# Patient Record
Sex: MALE | Race: WHITE | NOT HISPANIC OR LATINO | Employment: FULL TIME | URBAN - METROPOLITAN AREA
[De-identification: names, ages, dates, MRNs, and addresses within clinical notes are randomized per-mention and may not be internally consistent; named-entity substitution may affect disease eponyms.]

---

## 2018-01-17 ENCOUNTER — OFFICE VISIT CONVERTED (OUTPATIENT)
Dept: CARDIOLOGY | Facility: CLINIC | Age: 63
End: 2018-01-17
Attending: INTERNAL MEDICINE

## 2018-04-23 ENCOUNTER — OFFICE VISIT CONVERTED (OUTPATIENT)
Dept: FAMILY MEDICINE CLINIC | Facility: CLINIC | Age: 63
End: 2018-04-23
Attending: NURSE PRACTITIONER

## 2018-07-23 ENCOUNTER — CONVERSION ENCOUNTER (OUTPATIENT)
Dept: CARDIOLOGY | Facility: CLINIC | Age: 63
End: 2018-07-23

## 2018-07-23 ENCOUNTER — OFFICE VISIT CONVERTED (OUTPATIENT)
Dept: CARDIOLOGY | Facility: CLINIC | Age: 63
End: 2018-07-23
Attending: INTERNAL MEDICINE

## 2019-01-28 ENCOUNTER — OFFICE VISIT CONVERTED (OUTPATIENT)
Dept: CARDIOLOGY | Facility: CLINIC | Age: 64
End: 2019-01-28
Attending: NURSE PRACTITIONER

## 2019-02-02 ENCOUNTER — HOSPITAL ENCOUNTER (OUTPATIENT)
Dept: OTHER | Facility: HOSPITAL | Age: 64
Discharge: HOME | End: 2019-02-02
Attending: INTERNAL MEDICINE

## 2019-02-02 LAB
ALBUMIN SERPL-MCNC: 4.2 G/DL (ref 3.5–5)
ALP SERPL-CCNC: 72 U/L (ref 56–155)
ALT SERPL-CCNC: 22 U/L (ref 10–40)
AST SERPL-CCNC: 18 U/L (ref 15–50)
BASOPHILS # BLD AUTO: 0.04 10*3/UL (ref 0–0.2)
BASOPHILS NFR BLD AUTO: 0.53 % (ref 0–3)
BILIRUB SERPL-MCNC: 0.73 MG/DL (ref 0.2–1.3)
CONV BILI, CONJUGATED: <0.2 MG/DL (ref 0–0.6)
CONV TOTAL PROTEIN: 7.7 G/DL (ref 6.3–8.2)
CONV UNCONJUGATED BILIRUBIN: 0.5 MG/DL (ref 0–1.1)
CREAT UR-MCNC: 0.79 MG/DL (ref 0.7–1.2)
EOSINOPHIL # BLD AUTO: 0.25 10*3/UL (ref 0–0.7)
EOSINOPHIL # BLD AUTO: 3.58 % (ref 0–7)
ERYTHROCYTE [DISTWIDTH] IN BLOOD BY AUTOMATED COUNT: 12.9 % (ref 11.5–14.5)
ERYTHROCYTE [SEDIMENTATION RATE] IN BLOOD: 13 MM/H (ref 0–20)
HBA1C MFR BLD: 14.5 G/DL (ref 14–18)
HCT VFR BLD AUTO: 42.7 % (ref 42–52)
LYMPHOCYTES # BLD AUTO: 1.55 10*3/UL (ref 1–5)
MCH RBC QN AUTO: 32.8 PG (ref 27–31)
MCHC RBC AUTO-ENTMCNC: 34 G/DL (ref 33–37)
MCV RBC AUTO: 96.4 FL (ref 80–96)
MONOCYTES # BLD AUTO: 0.5 10*3/UL (ref 0.2–1.2)
MONOCYTES NFR BLD AUTO: 7.37 % (ref 3–10)
NEUTROPHILS # BLD AUTO: 4.51 10*3/UL (ref 2–8)
NEUTROPHILS NFR BLD AUTO: 65.9 % (ref 30–85)
NRBC BLD AUTO-RTO: 0 % (ref 0–0.01)
PLATELET # BLD AUTO: 262 10*3/UL (ref 130–400)
PMV BLD AUTO: 7.5 FL (ref 7.4–10.4)
RBC # BLD AUTO: 4.42 10*6/UL (ref 4.7–6.1)
VARIANT LYMPHS NFR BLD MANUAL: 22.6 % (ref 20–45)
WBC # BLD AUTO: 6.84 10*3/UL (ref 4.8–10.8)

## 2019-03-09 ENCOUNTER — HOSPITAL ENCOUNTER (OUTPATIENT)
Dept: OTHER | Facility: HOSPITAL | Age: 64
Discharge: HOME | End: 2019-03-09
Attending: INTERNAL MEDICINE

## 2019-03-09 LAB
ALBUMIN SERPL-MCNC: 4.1 G/DL (ref 3.5–5)
ALP SERPL-CCNC: 69 U/L (ref 56–155)
ALT SERPL-CCNC: 28 U/L (ref 10–40)
AST SERPL-CCNC: 20 U/L (ref 15–50)
BASOPHILS # BLD AUTO: 0.04 10*3/UL (ref 0–0.2)
BASOPHILS NFR BLD AUTO: 0.7 % (ref 0–3)
BILIRUB SERPL-MCNC: 0.92 MG/DL (ref 0.2–1.3)
CONV ABS IMM GRAN: 0.02 10*3/UL (ref 0–0.2)
CONV BILI, CONJUGATED: <0.2 MG/DL (ref 0–0.6)
CONV IMMATURE GRAN: 0.4 % (ref 0–1.8)
CONV TOTAL PROTEIN: 7.3 G/DL (ref 6.3–8.2)
CONV UNCONJUGATED BILIRUBIN: 0.7 MG/DL (ref 0–1.1)
CREAT UR-MCNC: 0.83 MG/DL (ref 0.7–1.2)
DEPRECATED RDW RBC AUTO: 48.6 FL (ref 35.1–43.9)
EOSINOPHIL # BLD AUTO: 0.22 10*3/UL (ref 0–0.7)
EOSINOPHIL # BLD AUTO: 4 % (ref 0–7)
ERYTHROCYTE [DISTWIDTH] IN BLOOD BY AUTOMATED COUNT: 13.8 % (ref 11.6–14.4)
ERYTHROCYTE [SEDIMENTATION RATE] IN BLOOD: 9 MM/H (ref 0–20)
HBA1C MFR BLD: 14 G/DL (ref 14–18)
HCT VFR BLD AUTO: 43 % (ref 42–52)
LYMPHOCYTES # BLD AUTO: 1.42 10*3/UL (ref 1–5)
MCH RBC QN AUTO: 31.7 PG (ref 27–31)
MCHC RBC AUTO-ENTMCNC: 32.6 G/DL (ref 33–37)
MCV RBC AUTO: 97.5 FL (ref 80–96)
MONOCYTES # BLD AUTO: 0.45 10*3/UL (ref 0.2–1.2)
MONOCYTES NFR BLD AUTO: 8.2 % (ref 3–10)
NEUTROPHILS # BLD AUTO: 3.32 10*3/UL (ref 2–8)
NEUTROPHILS NFR BLD AUTO: 60.7 % (ref 30–85)
NRBC CBCN: 0 % (ref 0–0.7)
PLATELET # BLD AUTO: 239 10*3/UL (ref 130–400)
PMV BLD AUTO: 10.7 FL (ref 9.4–12.4)
RBC # BLD AUTO: 4.41 10*6/UL (ref 4.7–6.1)
VARIANT LYMPHS NFR BLD MANUAL: 26 % (ref 20–45)
WBC # BLD AUTO: 5.47 10*3/UL (ref 4.8–10.8)

## 2019-03-31 ENCOUNTER — HOSPITAL ENCOUNTER (OUTPATIENT)
Dept: URGENT CARE | Facility: CLINIC | Age: 64
Discharge: HOME | End: 2019-03-31
Attending: NURSE PRACTITIONER

## 2019-07-08 ENCOUNTER — OFFICE VISIT CONVERTED (OUTPATIENT)
Dept: CARDIOLOGY | Facility: CLINIC | Age: 64
End: 2019-07-08
Attending: INTERNAL MEDICINE

## 2019-12-23 ENCOUNTER — CONVERSION ENCOUNTER (OUTPATIENT)
Dept: FAMILY MEDICINE CLINIC | Facility: CLINIC | Age: 64
End: 2019-12-23

## 2019-12-23 ENCOUNTER — OFFICE VISIT CONVERTED (OUTPATIENT)
Dept: FAMILY MEDICINE CLINIC | Facility: CLINIC | Age: 64
End: 2019-12-23
Attending: NURSE PRACTITIONER

## 2020-01-13 ENCOUNTER — OFFICE VISIT CONVERTED (OUTPATIENT)
Dept: CARDIOLOGY | Facility: CLINIC | Age: 65
End: 2020-01-13
Attending: INTERNAL MEDICINE

## 2020-03-07 ENCOUNTER — HOSPITAL ENCOUNTER (OUTPATIENT)
Dept: OTHER | Facility: HOSPITAL | Age: 65
Discharge: HOME | End: 2020-03-07
Attending: INTERNAL MEDICINE

## 2020-03-07 LAB
ALBUMIN SERPL-MCNC: 4.4 G/DL (ref 3.5–5)
ALP SERPL-CCNC: 77 U/L (ref 56–155)
ALT SERPL-CCNC: 23 U/L (ref 10–40)
AST SERPL-CCNC: 26 U/L (ref 15–50)
BASOPHILS # BLD AUTO: 0.05 10*3/UL (ref 0–0.2)
BASOPHILS NFR BLD AUTO: 0.8 % (ref 0–3)
BILIRUB SERPL-MCNC: 0.55 MG/DL (ref 0.2–1.3)
CONV ABS IMM GRAN: 0.02 10*3/UL (ref 0–0.2)
CONV BILI, CONJUGATED: 0.2 MG/DL (ref 0–0.6)
CONV IMMATURE GRAN: 0.3 % (ref 0–1.8)
CONV TOTAL PROTEIN: 7.3 G/DL (ref 6.3–8.2)
CONV UNCONJUGATED BILIRUBIN: 0.4 MG/DL (ref 0–1.1)
CREAT UR-MCNC: 0.86 MG/DL (ref 0.7–1.2)
DEPRECATED RDW RBC AUTO: 48.8 FL (ref 35.1–43.9)
EOSINOPHIL # BLD AUTO: 0.3 10*3/UL (ref 0–0.7)
EOSINOPHIL # BLD AUTO: 4.7 % (ref 0–7)
ERYTHROCYTE [DISTWIDTH] IN BLOOD BY AUTOMATED COUNT: 13.5 % (ref 11.6–14.4)
ERYTHROCYTE [SEDIMENTATION RATE] IN BLOOD: 4 MM/H (ref 0–20)
HCT VFR BLD AUTO: 44.4 % (ref 42–52)
HGB BLD-MCNC: 14.3 G/DL (ref 14–18)
LYMPHOCYTES # BLD AUTO: 1.41 10*3/UL (ref 1–5)
LYMPHOCYTES NFR BLD AUTO: 21.9 % (ref 20–45)
MCH RBC QN AUTO: 31.7 PG (ref 27–31)
MCHC RBC AUTO-ENTMCNC: 32.2 G/DL (ref 33–37)
MCV RBC AUTO: 98.4 FL (ref 80–96)
MONOCYTES # BLD AUTO: 0.63 10*3/UL (ref 0.2–1.2)
MONOCYTES NFR BLD AUTO: 9.8 % (ref 3–10)
NEUTROPHILS # BLD AUTO: 4.04 10*3/UL (ref 2–8)
NEUTROPHILS NFR BLD AUTO: 62.5 % (ref 30–85)
NRBC CBCN: 0 % (ref 0–0.7)
PLATELET # BLD AUTO: 241 10*3/UL (ref 130–400)
PMV BLD AUTO: 10.1 FL (ref 9.4–12.4)
RBC # BLD AUTO: 4.51 10*6/UL (ref 4.7–6.1)
WBC # BLD AUTO: 6.45 10*3/UL (ref 4.8–10.8)

## 2020-06-30 ENCOUNTER — HOSPITAL ENCOUNTER (OUTPATIENT)
Dept: LAB | Facility: HOSPITAL | Age: 65
Discharge: HOME | End: 2020-06-30
Attending: INTERNAL MEDICINE

## 2020-06-30 LAB
BASOPHILS # BLD AUTO: 0.06 10*3/UL (ref 0–0.2)
BASOPHILS NFR BLD AUTO: 0.8 % (ref 0–3)
CONV ABS IMM GRAN: 0.02 10*3/UL (ref 0–0.2)
CONV IMMATURE GRAN: 0.3 % (ref 0–1.8)
CREAT UR-MCNC: 0.99 MG/DL (ref 0.7–1.2)
DEPRECATED RDW RBC AUTO: 49.6 FL (ref 35.1–43.9)
EOSINOPHIL # BLD AUTO: 0.25 10*3/UL (ref 0–0.7)
EOSINOPHIL # BLD AUTO: 3.5 % (ref 0–7)
ERYTHROCYTE [DISTWIDTH] IN BLOOD BY AUTOMATED COUNT: 13.6 % (ref 11.6–14.4)
HCT VFR BLD AUTO: 45.6 % (ref 42–52)
HGB BLD-MCNC: 14.5 G/DL (ref 14–18)
LYMPHOCYTES # BLD AUTO: 1.55 10*3/UL (ref 1–5)
LYMPHOCYTES NFR BLD AUTO: 21.6 % (ref 20–45)
MCH RBC QN AUTO: 31.7 PG (ref 27–31)
MCHC RBC AUTO-ENTMCNC: 31.8 G/DL (ref 33–37)
MCV RBC AUTO: 99.6 FL (ref 80–96)
MONOCYTES # BLD AUTO: 0.62 10*3/UL (ref 0.2–1.2)
MONOCYTES NFR BLD AUTO: 8.6 % (ref 3–10)
NEUTROPHILS # BLD AUTO: 4.69 10*3/UL (ref 2–8)
NEUTROPHILS NFR BLD AUTO: 65.2 % (ref 30–85)
NRBC CBCN: 0 % (ref 0–0.7)
PLATELET # BLD AUTO: 239 10*3/UL (ref 130–400)
PMV BLD AUTO: 11 FL (ref 9.4–12.4)
RBC # BLD AUTO: 4.58 10*6/UL (ref 4.7–6.1)
WBC # BLD AUTO: 7.19 10*3/UL (ref 4.8–10.8)

## 2020-07-02 LAB
ALBUMIN SERPL-MCNC: 4.4 G/DL (ref 3.5–5)
ALP SERPL-CCNC: 73 U/L (ref 56–155)
ALT SERPL-CCNC: 24 U/L (ref 10–40)
AST SERPL-CCNC: 22 U/L (ref 15–50)
BILIRUB SERPL-MCNC: 0.91 MG/DL (ref 0.2–1.3)
CONV BILI, CONJUGATED: <0.2 MG/DL (ref 0–0.6)
CONV TOTAL PROTEIN: 7.5 G/DL (ref 6.3–8.2)
CONV UNCONJUGATED BILIRUBIN: 0.7 MG/DL (ref 0–1.1)

## 2020-07-27 ENCOUNTER — OFFICE VISIT CONVERTED (OUTPATIENT)
Dept: PODIATRY | Facility: CLINIC | Age: 65
End: 2020-07-27
Attending: PODIATRIST

## 2020-08-01 ENCOUNTER — HOSPITAL ENCOUNTER (OUTPATIENT)
Dept: OTHER | Facility: HOSPITAL | Age: 65
Discharge: HOME | End: 2020-08-01
Attending: INTERNAL MEDICINE

## 2020-08-01 LAB
ALBUMIN SERPL-MCNC: 4.4 G/DL (ref 3.5–5)
ALBUMIN/GLOB SERPL: 1.3 {RATIO} (ref 1.4–2.6)
ALP SERPL-CCNC: 73 U/L (ref 56–155)
ALT SERPL-CCNC: 21 U/L (ref 10–40)
ANION GAP SERPL CALC-SCNC: 14 MMOL/L (ref 8–19)
AST SERPL-CCNC: 19 U/L (ref 15–50)
BASOPHILS # BLD AUTO: 0.05 10*3/UL (ref 0–0.2)
BASOPHILS NFR BLD AUTO: 0.8 % (ref 0–3)
BILIRUB SERPL-MCNC: 1.04 MG/DL (ref 0.2–1.3)
BILIRUB SERPL-MCNC: 1.07 MG/DL (ref 0.2–1.3)
BUN SERPL-MCNC: 11 MG/DL (ref 5–25)
BUN/CREAT SERPL: 12 {RATIO} (ref 6–20)
CALCIUM SERPL-MCNC: 10 MG/DL (ref 8.7–10.4)
CHLORIDE SERPL-SCNC: 101 MMOL/L (ref 99–111)
CHOLEST SERPL-MCNC: 130 MG/DL (ref 107–200)
CHOLEST/HDLC SERPL: 2.5 {RATIO} (ref 3–6)
CONV ABS IMM GRAN: 0.01 10*3/UL (ref 0–0.2)
CONV BILI, CONJUGATED: <0.2 MG/DL (ref 0–0.6)
CONV CO2: 27 MMOL/L (ref 22–32)
CONV IMMATURE GRAN: 0.2 % (ref 0–1.8)
CONV TOTAL PROTEIN: 7.7 G/DL (ref 6.3–8.2)
CONV UNCONJUGATED BILIRUBIN: 0.9 MG/DL (ref 0–1.1)
CREAT UR-MCNC: 0.9 MG/DL (ref 0.7–1.2)
DEPRECATED RDW RBC AUTO: 47.6 FL (ref 35.1–43.9)
EOSINOPHIL # BLD AUTO: 0.21 10*3/UL (ref 0–0.7)
EOSINOPHIL # BLD AUTO: 3.3 % (ref 0–7)
ERYTHROCYTE [DISTWIDTH] IN BLOOD BY AUTOMATED COUNT: 13.4 % (ref 11.6–14.4)
ERYTHROCYTE [SEDIMENTATION RATE] IN BLOOD: 8 MM/H (ref 0–20)
GFR SERPLBLD BASED ON 1.73 SQ M-ARVRAT: >60 ML/MIN/{1.73_M2}
GLOBULIN UR ELPH-MCNC: 3.3 G/DL (ref 2–3.5)
GLUCOSE SERPL-MCNC: 100 MG/DL (ref 70–99)
HCT VFR BLD AUTO: 45.8 % (ref 42–52)
HDLC SERPL-MCNC: 53 MG/DL (ref 40–60)
HGB BLD-MCNC: 14.9 G/DL (ref 14–18)
LDLC SERPL CALC-MCNC: 59 MG/DL (ref 70–100)
LYMPHOCYTES # BLD AUTO: 1.62 10*3/UL (ref 1–5)
LYMPHOCYTES NFR BLD AUTO: 25.8 % (ref 20–45)
MCH RBC QN AUTO: 31.8 PG (ref 27–31)
MCHC RBC AUTO-ENTMCNC: 32.5 G/DL (ref 33–37)
MCV RBC AUTO: 97.9 FL (ref 80–96)
MONOCYTES # BLD AUTO: 0.51 10*3/UL (ref 0.2–1.2)
MONOCYTES NFR BLD AUTO: 8.1 % (ref 3–10)
NEUTROPHILS # BLD AUTO: 3.88 10*3/UL (ref 2–8)
NEUTROPHILS NFR BLD AUTO: 61.8 % (ref 30–85)
NRBC CBCN: 0 % (ref 0–0.7)
OSMOLALITY SERPL CALC.SUM OF ELEC: 285 MOSM/KG (ref 273–304)
PLATELET # BLD AUTO: 227 10*3/UL (ref 130–400)
PMV BLD AUTO: 9.9 FL (ref 9.4–12.4)
POTASSIUM SERPL-SCNC: 4.4 MMOL/L (ref 3.5–5.3)
RBC # BLD AUTO: 4.68 10*6/UL (ref 4.7–6.1)
SODIUM SERPL-SCNC: 138 MMOL/L (ref 135–147)
TRIGL SERPL-MCNC: 88 MG/DL (ref 40–150)
VLDLC SERPL-MCNC: 18 MG/DL (ref 5–37)
WBC # BLD AUTO: 6.28 10*3/UL (ref 4.8–10.8)

## 2020-08-10 ENCOUNTER — OFFICE VISIT CONVERTED (OUTPATIENT)
Dept: CARDIOLOGY | Facility: CLINIC | Age: 65
End: 2020-08-10
Attending: INTERNAL MEDICINE

## 2020-10-30 ENCOUNTER — HOSPITAL ENCOUNTER (OUTPATIENT)
Dept: LAB | Facility: HOSPITAL | Age: 65
Discharge: HOME | End: 2020-10-30
Attending: INTERNAL MEDICINE

## 2020-10-30 LAB
BASOPHILS # BLD AUTO: 0.05 10*3/UL (ref 0–0.2)
BASOPHILS NFR BLD AUTO: 0.7 % (ref 0–3)
CONV ABS IMM GRAN: 0.02 10*3/UL (ref 0–0.2)
CONV IMMATURE GRAN: 0.3 % (ref 0–1.8)
DEPRECATED RDW RBC AUTO: 49.5 FL (ref 35.1–43.9)
EOSINOPHIL # BLD AUTO: 0.19 10*3/UL (ref 0–0.7)
EOSINOPHIL # BLD AUTO: 2.8 % (ref 0–7)
ERYTHROCYTE [DISTWIDTH] IN BLOOD BY AUTOMATED COUNT: 13.5 % (ref 11.6–14.4)
ERYTHROCYTE [SEDIMENTATION RATE] IN BLOOD: 7 MM/H (ref 0–20)
HCT VFR BLD AUTO: 46.9 % (ref 42–52)
HGB BLD-MCNC: 14.9 G/DL (ref 14–18)
LYMPHOCYTES # BLD AUTO: 1.84 10*3/UL (ref 1–5)
LYMPHOCYTES NFR BLD AUTO: 27.3 % (ref 20–45)
MCH RBC QN AUTO: 31.7 PG (ref 27–31)
MCHC RBC AUTO-ENTMCNC: 31.8 G/DL (ref 33–37)
MCV RBC AUTO: 99.8 FL (ref 80–96)
MONOCYTES # BLD AUTO: 0.59 10*3/UL (ref 0.2–1.2)
MONOCYTES NFR BLD AUTO: 8.7 % (ref 3–10)
NEUTROPHILS # BLD AUTO: 4.06 10*3/UL (ref 2–8)
NEUTROPHILS NFR BLD AUTO: 60.2 % (ref 30–85)
NRBC CBCN: 0 % (ref 0–0.7)
PLATELET # BLD AUTO: 264 10*3/UL (ref 130–400)
PMV BLD AUTO: 10.4 FL (ref 9.4–12.4)
RBC # BLD AUTO: 4.7 10*6/UL (ref 4.7–6.1)
WBC # BLD AUTO: 6.75 10*3/UL (ref 4.8–10.8)

## 2020-10-31 LAB
ALBUMIN SERPL-MCNC: 4.7 G/DL (ref 3.5–5)
ALP SERPL-CCNC: 81 U/L (ref 56–155)
ALT SERPL-CCNC: 25 U/L (ref 10–40)
AST SERPL-CCNC: 27 U/L (ref 15–50)
BILIRUB SERPL-MCNC: 1.17 MG/DL (ref 0.2–1.3)
CONV BILI, CONJUGATED: <0.2 MG/DL (ref 0–0.6)
CONV TOTAL PROTEIN: 7.7 G/DL (ref 6.3–8.2)
CONV UNCONJUGATED BILIRUBIN: 1 MG/DL (ref 0–1.1)
CREAT UR-MCNC: 0.85 MG/DL (ref 0.7–1.2)

## 2020-11-13 ENCOUNTER — CONVERSION ENCOUNTER (OUTPATIENT)
Dept: FAMILY MEDICINE CLINIC | Facility: CLINIC | Age: 65
End: 2020-11-13

## 2020-11-13 ENCOUNTER — OFFICE VISIT CONVERTED (OUTPATIENT)
Dept: FAMILY MEDICINE CLINIC | Facility: CLINIC | Age: 65
End: 2020-11-13
Attending: NURSE PRACTITIONER

## 2020-12-28 ENCOUNTER — HOSPITAL ENCOUNTER (OUTPATIENT)
Dept: LAB | Facility: HOSPITAL | Age: 65
Discharge: HOME | End: 2020-12-28
Attending: INTERNAL MEDICINE

## 2020-12-28 LAB
ALBUMIN SERPL-MCNC: 4.6 G/DL (ref 3.5–5)
ALP SERPL-CCNC: 79 U/L (ref 56–155)
ALT SERPL-CCNC: 24 U/L (ref 10–40)
AST SERPL-CCNC: 26 U/L (ref 15–50)
BASOPHILS # BLD AUTO: 0.04 10*3/UL (ref 0–0.2)
BASOPHILS NFR BLD AUTO: 0.6 % (ref 0–3)
BILIRUB SERPL-MCNC: 1.32 MG/DL (ref 0.2–1.3)
CONV ABS IMM GRAN: 0.01 10*3/UL (ref 0–0.2)
CONV BILI, CONJUGATED: 0.2 MG/DL (ref 0–0.6)
CONV IMMATURE GRAN: 0.1 % (ref 0–1.8)
CONV TOTAL PROTEIN: 8 G/DL (ref 6.3–8.2)
CONV UNCONJUGATED BILIRUBIN: 1.1 MG/DL (ref 0–1.1)
CREAT UR-MCNC: 0.72 MG/DL (ref 0.7–1.2)
DEPRECATED RDW RBC AUTO: 47.8 FL (ref 35.1–43.9)
EOSINOPHIL # BLD AUTO: 0.16 10*3/UL (ref 0–0.7)
EOSINOPHIL # BLD AUTO: 2.2 % (ref 0–7)
ERYTHROCYTE [DISTWIDTH] IN BLOOD BY AUTOMATED COUNT: 13.3 % (ref 11.6–14.4)
ERYTHROCYTE [SEDIMENTATION RATE] IN BLOOD: 10 MM/H (ref 0–20)
HCT VFR BLD AUTO: 45.9 % (ref 42–52)
HGB BLD-MCNC: 15 G/DL (ref 14–18)
LYMPHOCYTES # BLD AUTO: 1.76 10*3/UL (ref 1–5)
LYMPHOCYTES NFR BLD AUTO: 24.5 % (ref 20–45)
MCH RBC QN AUTO: 31.8 PG (ref 27–31)
MCHC RBC AUTO-ENTMCNC: 32.7 G/DL (ref 33–37)
MCV RBC AUTO: 97.2 FL (ref 80–96)
MONOCYTES # BLD AUTO: 0.59 10*3/UL (ref 0.2–1.2)
MONOCYTES NFR BLD AUTO: 8.2 % (ref 3–10)
NEUTROPHILS # BLD AUTO: 4.61 10*3/UL (ref 2–8)
NEUTROPHILS NFR BLD AUTO: 64.4 % (ref 30–85)
NRBC CBCN: 0 % (ref 0–0.7)
PLATELET # BLD AUTO: 266 10*3/UL (ref 130–400)
PMV BLD AUTO: 10.2 FL (ref 9.4–12.4)
RBC # BLD AUTO: 4.72 10*6/UL (ref 4.7–6.1)
WBC # BLD AUTO: 7.17 10*3/UL (ref 4.8–10.8)

## 2021-03-31 ENCOUNTER — CONVERSION ENCOUNTER (OUTPATIENT)
Dept: OTHER | Facility: HOSPITAL | Age: 66
End: 2021-03-31

## 2021-03-31 ENCOUNTER — OFFICE VISIT CONVERTED (OUTPATIENT)
Dept: CARDIOLOGY | Facility: CLINIC | Age: 66
End: 2021-03-31
Attending: NURSE PRACTITIONER

## 2021-05-10 NOTE — H&P
History and Physical      Patient Name: Yoan Franco Jr.   Patient ID: [id number]   Sex: Male   YOB: 1955    Primary Care Provider: Ian SIMMONS   Referring Provider: Ian SIMMONS    Visit Date: July 27, 2020    Provider: Poncho Shaffer DPM   Location: [facility]   Location Address: [address] [place] [address]  [id number]   Location Phone: [phone]          Chief Complaint  · Left Foot Pain  · Fungal Toenails      History Of Present Illness  Yoan Franco Jr. is a 64 year old /White male who presents to the [facility] today new patient referred from Ian SIMMONS.   Yoan Fracno Jr. presents to the office today for evaluation and treatment of      New, Established, New Problem: New  Location: Left ankle  Duration: Early 2019  Onset: Insidious  Nature: Sore, intermittent  Stable, worsening, improving: Stable  Aggravating factors:  Patient relates pain is aggravated by shoe gear and ambulation.    Previous Treatment: None    Patient denies any fevers, chills, nausea, vomiting, shortness of breathe, nor any other constitutional signs nor symptoms.    Patient states he is treated for psoriatic and rheumatoid arthritis.    Patient works as a manager at [email or web address].      New, Established, New Problem: Second problem  Location: Toenails  Duration:  Greater than five years  Onset: Gradual  Nature: sore with palpation.  Stable, worsening, improving: Worsening  Aggravating factors: Pain with shoe gear and ambulation.  Previous Treatment:       Past Medical History  Allergies; Ankle pain; Ankle pain, left; Arthritis; Deafness; Essential hypertension; Hammertoe; Heart Attack; Heel pain; Hemorrhoids; Hyperlipidemia; Hyperlipidemia; Hypertension; Ingrown toenail; Kidney Disease; Skin Disease         Past Surgical History  cardiac stents; Joint Surgery; Knee surgery         Medication List  aspirin 81  mg oral tablet,delayed release (DR/EC); atorvastatin 20 mg oral tablet; bilberry 100 mg oral capsule; carvedilol 3.125 mg oral tablet; Cinnamon 500 mg oral capsule; Co Q-10 200 mg oral capsule; flaxseed oil-omega 3,6,9 1,300 mg-845 mg -117 mg-117 mg oral capsule; folic acid 1 mg oral tablet; L-Carnitine 500 mg oral tablet; losartan 25 mg oral tablet; lutein-zeaxanthin 25-5 mg oral capsule; magnesium oral; magnesium oxide 400 mg magnesium oral tablet; Men's Multivitamin 400- mcg oral tablet; methotrexate sodium 2.5 mg oral tablet; milk thistle seed extract 200 mg oral capsule; nitroglycerin 0.4 mg sublingual tablet, sublingual; pyridoxine (vitamin B6) 200 mg oral tablet extended release; spironolactone 25 mg oral tablet; turmeric root extract 500 mg oral capsule; vitamin B complex oral tablet; Vitamin B-12 2,500 mcg sublingual tablet, sublingual; Vitamin C With Ericka Hips 1,000 mg oral tablet; zinc gluconate 50 mg oral tablet         Allergy List  CORTICOSTEROIDS (GLUCOCORTICOIDS); Latex       Allergies Reconciled  Family Medical History  Breast Neoplasm, Malignant; Pancreatic Neoplasm, Malignant; Stroke; Heart Disease; Diabetes Mellitus, Type II         Social History  Alcohol (Current some day); Tobacco (Former)         Immunizations  Name Date Admin   Hepatitis A    Hepatitis A    Influenza    Influenza    [id number]    Prevnar 13          Review of Systems  · Constitutional  o Denies  o : fever, chills, additional constitutional symptoms except as noted in the HPI  · Eyes  o Denies  o : double vision  · HENT  o Denies  o : vertigo, recent head injury  · Cardiovascular  o Denies  o : chest pain, irregular heart beats  · Respiratory  o Denies  o : shortness of breath  · Gastrointestinal  o Denies  o : nausea, vomiting  · Integument  o Denies  o : new skin lesions  · Neurologic  o Denies  o : altered mental status, tingling or numbness  · Musculoskeletal  o * See HPI      Vitals  Date Time BP Position Site L\R  "Cuff Size HR RR TEMP (F) WT  HT  BMI kg/m2 BSA m2 O2 Sat HC       07/27/2020 02:54 /67 Sitting    48 - R  98 251lbs 16oz 5'  10\" 36.16 2.38 98 %          Physical Examination  · Constitutional  o Appearance  o : Awake, alert, well developed, well nourished and well groomed  · Cardiovascular  o Peripheral Vascular System  o :   § Pedal Pulses  § : Pedal Pulses are +2 and symmetrical   § Extremities  § : No edema of the lower extremities  · Musculoskeletal  o General  o :   § General Musculoskeletal  § : Lower extremity muscle strength and range of motion is equal and symmetrical bilaterally. Nonreducible contracted lesser digits seen bilaterally.  · Skin and Subcutaneous Tissue  o General Inspection  o : Skin is noted to have normal texture and turgor, with no excresences noted.  o Digits and Nails  o : The tonails are noted to be without disease.  · Neurologic  o Sensation  o : Epicritic sensations intact bilaterally.  · Left Ankle/Foot  o Inspection  o : Left ankle dorsiflexion is painful. Left ankle plantarflexion is painful. Improved compared to previous visit. No signs of edema, erythema, lymphangitis, nor signs of infection.   · In Office Procedures  o View  o : AP/LATERAL/OBLIQUE   o Site  o : left, ankle   o Indication  o : Left ankle pain   o Study  o : X-rays ordered, taken in the office, and reviewed today.  o Xray  o : Early degenerative changes were seen throughout the right ankle with slight narrowing of the ankle joint mortise.  o Comparative Data  o : No comparative data found     Toenails 1, 2, 3, 4, and 5 bilaterally are incurvated, elongated, thickened, yellow, chalky, and painful to palpation.  No signs of edema, erythema, lymphangitis, or signs of infection.      Toenails 1 through 5 bilaterally were debrided in thickness and length.               Assessment  · Foot pain, bilateral       Pain in right foot     729.5/M79.671  Pain in left foot     729.5/M79.672  · Primary osteoarthritis, " left ankle and foot     715.17/M19.072  · Ingrowing nail     703.0/L60.0  · Onychomycosis     110.1/B35.1  · Tinea unguium     110.1/B35.1      Plan  · Orders  o Ankle (Left) 3 views X-Ray [facility] Preferred View (95584-[id number]) - - 07/27/2020  o Debridement of six or more nails (18692) - - 07/27/2020  · Medications  o Medications have been Reconciled  o Transition of Care or Provider Policy  · Instructions  o I have discussed the findings of this evaluation with the patient. The discussion included a complete verbal explanation of any changes in the examination results, diagnosis, and the current treatment plan. A schedule for future care needs was explained. If any questions should arise after returning home, I have encouraged the patient to feel free to contact Dr. Shaffer. The patient states understanding and agreement with this plan.  o Pt to monitor for problems and to contact Dr. Shaffer for follow-up should such signs occur. Patient states understanding and agreement with this plan.   o Overview: Osteoarthritis occurs when progressive damage to articular cartilage is caused by multiple factors that are comprised of joint integrity, genetics, local inflammation, mechanical forces, and cellular and biochemical processes. It is the most common joint disorder and leading cause of disability for patients over the age of 65. Males and females are equally affected and genetics play a role in up to 65% of cases. Osteoarthritis can be categorized as either inflammatory or noninflammatory based on presentation. Patients typically complain of joint pain and stiffness. Joint tenderness, crepitus or joint effusion may be present. The objective of therapy is to control pain, decrease swelling, reduce disability and enhance quality of life.  o Pharmacological Treatment: This patient is having an acute flare of osteoarthritis and intra-articular injection of a corticosteroid has been administered. Patient is advised that  intra-articular corticosteroid injections are not to exceed more than 3 per year.   o Discuss Findings: I have discussed the findings of this evaluation with the patient. The discussion included a complete verbal explanation of any changes in the examination results, diagnosis, and the current treatment plan. A schedule for future care needs was explained. If any questions should arise after returning home, I have encouraged the patient to feel free to contact Dr. Shaffer. The patient states understanding and agreement with this plan.  o Patient states he would like to continue to trim his own toenails at home and will contact Dr. Shaffer if these get too difficult to take care of by himself at home.  o Upon giving treatment options of a cortisone injection in the left ankle, the patient state his left ankle does not hurt that bad today but will consider it will contact Dr. Shaffer's office for an appointment should symptoms persist or worsen.  o Electronically Identified Patient Education Materials Provided Electronically  · Disposition  o Call or Return if symptoms worsen or persist.            Electronically Signed by: Poncho Shaffer DPM -Author on July 27, 2020 03:30:03 PM

## 2021-05-13 NOTE — PROGRESS NOTES
Progress Note      Patient Name: Yoan Franco Jr.   Patient ID: [id number]   Sex: Male   YOB: 1955    Primary Care Provider: Ian SIMMONS   Referring Provider: Ian SIMMONS    Visit Date: August 10, 2020    Provider: Nevaeh Lovelace MD   Location: [facility]   Location Address: [address] [place] [address], [address]   [place]  [id number]   Location Phone: [phone]          Chief Complaint     Follow up on coronary artery disease.       History Of Present Illness  REFERRING PROVIDER: Ian SIMMONS   Yoan Franco Jr. is a 64 year old /White male who denies any chest pain or pressure. No palpitations, shortness of breath, swelling, dizziness, syncope, PND, or orthopnea. Cardiac-wise, he is feeling very well. He exercises on a stationary bike. He had gained a lot of weight, he said, due to COVID, but he has lost it back down, so his weight is the same as it was 6 months ago, and overall, he is feeling good. Blood pressures at home are running between 109/67 to 126/69 and all in good range.   PAST MEDICAL HISTORY: Coronary artery disease with previous MI and stent/PCI to the LAD (July 2016); Hyperlipidemia; Hypertension.   FAMILY HISTORY: Positive for hypertension. Negative for diabetes mellitus or heart disease.   PSYCHOSOCIAL HISTORY: Previously smoked, but quit. Moderate alcohol consumption. Denies mood changes or depression.   CURRENT MEDICATIONS: Aspirin 81 mg daily; atorvastatin 20 mg q. h.s.; carvedilol 3.125 mg q. h.s.; losartan 25 mg daily; methotrexate 2.5 4 a week; nitroglycerin p.r.n.; spironolactone 25 mg every 2 days; large amount of various over-the-counter supplements.       Review of Systems  · Cardiovascular  o Denies  o : palpitations (fast, fluttering, or skipping beats), swelling (feet, ankles, hands), shortness of breath while walking or lying flat, chest pain or angina pectoris   · Respiratory  o Denies  o : chronic or frequent  "cough, asthma or wheezing      Vitals  Date Time BP Position Site L\R Cuff Size HR RR TEMP (F) WT  HT  BMI kg/m2 BSA m2 O2 Sat HC       08/10/2020 11:52 /66 Sitting    80 - R   250lbs 0oz 5'  10\" 35.87 2.37           Physical Examination  · Constitutional  o Appearance  o : Awake, alert, in no acute distress, accompanied by his wife.  · Respiratory  o Respiratory  o : Increased AP diameter with diminished breath sounds. Prolonged expiration. Negative rales or rhonchi.   · Cardiovascular  o Heart  o : PMI not well felt. S1, S2 regular. No S3. No S4. Negative systolic/diastolic murmur.   o Peripheral Vascular System  o :   § Extremities  § : Good femoral and pedal pulses. No pedal edema.  · Gastrointestinal  o Abdominal Examination  o : Soft. No tenderness or masses felt. No hepatosplenomegaly. Abdominal aorta is not palpable.  · Labs  o Labs  o : Total cholesterol 130, triglycerides 88, HDL 53, LDL 59. Blood sugar 100.          Assessment     1.  Coronary artery disease with previous MI and stent without angina.  2.  Hyperlipidemia, at goal.  3.  Hypertension, controlled.       Plan     1.  Continue current medications.  2.  Follow up in 6 months with EKG and labs or earlier if needed.      IRIS Zimmerman/Nevaeh Lovelace MD, [facility]  JF:PM:vm               Electronically Signed by: Kamilla Gong-, Other -Author on August 12, 2020 11:25:08 AM  Electronically Co-signed by: Nevaeh Lovelace MD -Reviewer on August 14, 2020 04:48:12 PM  "

## 2021-05-13 NOTE — PROGRESS NOTES
Progress Note      Patient Name: Yoan Franco Jr.   Patient ID: [id number]   Sex: Male   YOB: 1955    Primary Care Provider: Ian SIMMONS   Referring Provider: Ian SIMMONS    Visit Date: November 13, 2020    Provider: IRIS Spivey   Location: [facility]   Location Address: [address] [place] [address], [address] [place] [address]  [id number]   Location Phone: [phone]          Chief Complaint  · Follow up one year      History Of Present Illness  Yoan Franco Jr. is a 65 year old /White male who presents for evaluation and treatment of:      Patient presents to the office today for an annual checkup.  Patient states that he is doing well and denies any concerns or complaints at this time.  He does state that he sees cardiology on a routine basis and has had labs completed at the hospital.  I did review his lipid panel CMP and CBC that was in [facility].  Patient is due for a PSA in December.  I explained to the patient that we will give him his order and he could do this at his convenience.  Patient states that his blood pressures been running in the 120s over 60s at home.  He denies any chest pain shortness of breath or palpitations.  Patient will be due for Cologuard next year as his last one was unremarkable.       Past Medical History  Disease Name Date Onset Notes   Allergies --  --    Ankle pain --  --    Ankle pain, left 07/27/2020 --    Arthritis --  --    Deafness --  --    Essential hypertension 04/23/2018 --    Hammertoe --  --    Heart Attack --  --    Heel pain --  --    Hemorrhoids --  --    Hyperlipidemia --  --    Hyperlipidemia 04/23/2018 --    Hypertension --  --    Ingrown toenail --  --    Kidney Disease --  --    Skin Disease --  --          Past Surgical History  Procedure Name Date Notes   cardiac stents 2016 2016   Joint Surgery --  --    Knee surgery --  --          Medication List  Name Date Started Instructions   apple  cider vinegar 600 mg oral capsule  take 1 capsule by oral route daily   aspirin 81 mg oral tablet,delayed release (DR/EC)  take 1 tablet (81 mg) by oral route once daily   atorvastatin 20 mg oral tablet 09/01/2020 TAKE 1 TABLET(20 MG) BY MOUTH EVERY DAY   Benefiber Clear SF (dextrin) 3 gram/3.5 gram oral powder in packet  dissolve as directed 1 packet and take by oral route As needed   bilberry 100 mg oral capsule  take 1 capsule by oral route daily   Brilinta 90 mg oral tablet  take 1 tablet (90 mg) by oral route 2 times per day   carvedilol 3.125 mg oral tablet 10/08/2020 TAKE 1 TABLET BY MOUTH EVERY DAY   Cinnamon 500 mg oral capsule  take 2 capsules by oral route daily   Co Q-10 200 mg oral capsule  take 1 capsule by oral route daily   flaxseed oil-omega 3,6,9 1,300 mg-845 mg -117 mg-117 mg oral capsule  take 2 capsules by oral route daily   folic acid 1 mg oral tablet  take 1 tablet (1 mg) by oral route once daily   L-Carnitine 500 mg oral tablet  take 1 tablet by oral route daily   losartan 25 mg oral tablet 03/17/2020 TAKE ONE TABLET BY MOUTH EVERY DAY   lutein-zeaxanthin 25-5 mg oral capsule  take 1 capsule by oral route daily   magnesium oral  --    magnesium oxide 400 mg magnesium oral tablet  take 1 tablet by oral route daily   Men's Multivitamin 400- mcg oral tablet  take 1 tablet by oral route daily   methotrexate sodium 2.5 mg oral tablet  take 1 tablet by oral route QD for 4 days   milk thistle seed extract 200 mg oral capsule  take 2 capsules by oral route daily   nitroglycerin 0.4 mg sublingual tablet, sublingual 07/02/2020 ONE TABLET UNDER TONGUE AS NEEDED FOR CHEST PAIN OR AS DIRECTED   Probiotic oral  --    pyridoxine (vitamin B6) 200 mg oral tablet extended release  take 1 tablet by oral route daily   spironolactone 25 mg oral tablet 08/03/2020 Take 1 tablet by mouth every other day   turmeric root extract 500 mg oral capsule  take 2 capsules by oral route daily   vitamin B complex oral  tablet  take 1 tablet by oral route daily   Vitamin B-12 2,500 mcg sublingual tablet, sublingual  place 1 tablet under tongue by translingual route daily   Vitamin C With Ericka Hips 1,000 mg oral tablet  take 1 tablet by oral route daily   zinc gluconate 50 mg oral tablet  take 1 tablet by oral route daily         Allergy List  Allergen Name Date Reaction Notes   CORTICOSTEROIDS (GLUCOCORTICOIDS) --  --  --    Latex --  --  --          Family Medical History  Disease Name Relative/Age Notes   Breast Neoplasm, Malignant  --    Pancreatic Neoplasm, Malignant  --    Stroke  --    Heart Disease  --    Diabetes Mellitus, Type II  --          Social History  Finding Status Start/Stop Quantity Notes   Alcohol Current some day --/-- --  --    Tobacco Former --/-- --  --          Immunizations  NameDate Admin Mfg Trade Name Lot Number Route Inj VIS Given VIS Publication   Hepatitis A02/07/2019 SKB HAVRIX-ADULT [id number]  RD 02/07/2019 07/20/2016   Comments: ptleft office in stable condition   Hepatitis A04/23/2018 SKB HAVRIX-ADULT [id number] IM  04/23/2018 10/25/2011   Comments: Pt tolerated the injection well.   Rnuweswoo61/30/2020 SKB Fluarix, quadrivalent, preservative free [id number]  LD 10/30/2020    Comments:    Khxjemksd8183/07/2019 MSD PNEUMOVAX 23 [id number] Pending sale to [facility] 02/07/2019 04/24/2015   Comments: pt left office in stable condition   Prevnar 1304/23/2018 [facility] PREVNAR 13 [id number] IM  04/23/2018 11/05/2015   Comments: Pt tolerated the injection well.         Review of Systems  · Constitutional  o Denies  o : fever, fatigue, weight loss, weight gain  · Cardiovascular  o Denies  o : lower extremity edema, claudication, chest pressure, palpitations  · Respiratory  o Denies  o : shortness of breath, wheezing, cough, hemoptysis, dyspnea on exertion  · Gastrointestinal  o Denies  o : nausea, vomiting, diarrhea, constipation, abdominal pain      Vitals  Date Time BP Position Site L\R Cuff Size HR RR TEMP (F) WT  HT  BMI kg/m2 BSA m2 O2  "Sat FR L/min FiO2        11/13/2020 03:29 /70 Sitting    64 - R  97.8 256lbs 0oz 5'  10\" 36.73 2.39 98 %            Physical Examination  · Constitutional  o Appearance  o : well-nourished, in no acute distress  · Neck  o Inspection/Palpation  o : normal appearance, no masses or tenderness, trachea midline  o Range of Motion  o : cervical range of motion within normal limits  o Thyroid  o : gland size normal, nontender, no nodules or masses present on palpation  · Respiratory  o Respiratory Effort  o : breathing unlabored  o Inspection of Chest  o : normal appearance  o Auscultation of Lungs  o : normal breath sounds throughout inspiration and expiration  · Cardiovascular  o Heart  o :   § Auscultation of Heart  § : regular rate and rhythm, no murmurs, gallops or rubs  o Peripheral Vascular System  o :   § Pedal Pulses  § : pulses 2 bilaterally  § Extremities  § : no clubbing or edema  · Gastrointestinal  o Abdominal Examination  o : abdomen nontender to palpation, tone normal without rigidity or guarding, no masses present, bowel sounds present in all four quadrants  · Skin and Subcutaneous Tissue  o General Inspection  o : no rashes or lesions present, no areas of discoloration  o Body Hair  o : hair normal for age, general body hair distribution normal for age  o Digits and Nails  o : no clubbing, cyanosis, deformities or edema present, normal appearing nails  · Neurologic  o Mental Status Examination  o :   § Orientation  § : grossly oriented to person, place and time  o Gait and Station  o : normal gait, able to stand without difficulty  · Psychiatric  o Judgement and Insight  o : judgment and insight intact  o Mood and Affect  o : mood normal, affect appropriate  o Presence of Abnormal Thoughts  o : no hallucinations, no delusions present, no psychotic thoughts          Assessment  · Screening for prostate cancer     V76.44/Z12.5  · Well adult exam     V70.0/Z00.00      Plan  · Orders  o PSA " Ultrasensitive, ANNUAL SCREENING [facility] (, [address]) - V76.44/Z12.5 - 11/13/2020  o ACO-39: Current medications updated and reviewed (1159F, ) - - 11/13/2020  · Medications  o Medications have been Reconciled  o Transition of Care or Provider Policy  · Instructions  o Patient was educated/instructed on their diagnosis, treatment and medications prior to discharge from the clinic today.  o Time spent with the patient was minutes, more than 50% face to face.  o Electronically Identified Patient Education Materials Provided Electronically  · Disposition  o Call or Return if symptoms worsen or persist.  o Follow up as scheduled            Electronically Signed by: IRIS Spivey -Author on November 13, 2020 04:30:23 PM

## 2021-05-14 VITALS
DIASTOLIC BLOOD PRESSURE: 66 MMHG | HEIGHT: 70 IN | SYSTOLIC BLOOD PRESSURE: 136 MMHG | WEIGHT: 254 LBS | HEART RATE: 52 BPM | BODY MASS INDEX: 36.36 KG/M2

## 2021-05-14 VITALS
OXYGEN SATURATION: 98 % | HEART RATE: 64 BPM | HEIGHT: 70 IN | BODY MASS INDEX: 36.65 KG/M2 | WEIGHT: 256 LBS | SYSTOLIC BLOOD PRESSURE: 142 MMHG | DIASTOLIC BLOOD PRESSURE: 70 MMHG | TEMPERATURE: 97.8 F

## 2021-05-14 NOTE — PROGRESS NOTES
"   Progress Note      Patient Name: Yoan Franco Jr.   Patient ID: [id number]   Sex: Male   YOB: 1955    Primary Care Provider: Ian SIMMONS   Referring Provider: Ian SIMMONS    Visit Date: March 31, 2021    Provider: IRIS Espinoza   Location: [facility]   Location Address: [address] [place] [address], [address]   [place], [place]  [id number]   Location Phone: [phone]          History Of Present Illness  REFERRING PROVIDER: Ian SIMMONS   Yoan Franco Jr. is a 65 year old /White male who denies any chest pain or pressure. No palpitations, shortness of breath, swelling, dizziness, syncope, PND, or orthopnea. Cardiac-wise he is feeling very well. His weight is up 4 pounds since his last visit. He has not had the COVID vaccine and is not inclined to get it at this time.   PAST MEDICAL HISTORY: Coronary artery disease with previous MI and stent/PCI to the LAD (July 2016); Hyperlipidemia; Hypertension.   PSYCHOSOCIAL HISTORY: Moderate use of alcohol. Previous use of tobacco, but quit.   CURRENT MEDICATIONS: Aspirin 81 mg daily; atorvastatin 20 mg q. h.s.; carvedilol 3.125 mg q. h.s.; losartan 25 mg daily; methotrexate 2.5 mg 4 tabs a week; nitroglycerin p.r.n.; spironolactone 25 mg every other day; large amount of various over-the-counter supplements.      ALLERGIES: No known drug allergies.       Review of Systems  · Cardiovascular  o Denies  o : palpitations (fast, fluttering, or skipping beats), swelling (feet, ankles, hands), shortness of breath while walking or lying flat, chest pain or angina pectoris   · Respiratory  o Denies  o : chronic or frequent cough      Vitals  Date Time BP Position Site L\R Cuff Size HR RR TEMP (F) WT  HT  BMI kg/m2 BSA m2 O2 Sat FR L/min FiO2 HC       03/31/2021 03:15 /66 Sitting    52 - R   253lbs 16oz 5'  10\" 36.44 2.39             Physical Examination  · Constitutional  o Appearance  o : Awake, alert, in no acute distress, " accompanied by his wife.  · Eyes  o Conjunctivae  o : Conjunctivae normal.  · Ears, Nose, Mouth and Throat  o Oral Cavity  o :   § Oral Mucosa  § : Normal.  · Neck  o Jugular Veins  o : No JVD. Good carotid upstroke. No bruits noted.  · Respiratory  o Respiratory  o : Increased AP diameter with diminished breath sounds. Prolonged expiration. Negative rales or rhonchi.   · Cardiovascular  o Heart  o : PMI is not well felt. S1, S2 normal. No S3. No S4. Negative systolic/diastolic murmur.  o Peripheral Vascular System  o :   § Extremities  § : Good femoral and pedal pulses. No pedal edema.  · Gastrointestinal  o Abdominal Examination  o : Soft. No tenderness or masses felt. No hepatosplenomegaly. Abdominal aorta is not palpable.  · EKG  o EKG  o : Performed in the office today.   o Indications  o : Coronary artery disease.  o Results  o : Sinus bradycardia, rate of 49.   o Comparison  o : Rate is a little bit slower than his EKG of July 2019.   · Labs  o Labs  o : Total cholesterol 133, triglycerides 112, HDL 51, LDL 60.          Assessment     1.  Coronary artery disease with previous MI and stent/PCI, without angina.   2.  Bradycardia, stable.   3.  Hyperlipidemia, at goal.       Plan     1.  He has been bradycardic for a long time and tolerates the low dose carvedilol without any fatigue or side        effects so continue the carvedilol.  2.  Continue the losartan, spironolactone in view of his hypertension and coronary artery disease.   3.  Continue the atorvastatin in view of his hyperlipidemia.  4.  Continue the aspirin in view of his coronary artery disease.  5.  Discussed the COVID vaccine and indications.  He agrees to think about receiving the vaccine.   6.  Follow up in 9 months with labs or earlier if needed.     IRIS Zimmerman  JF/rt               Electronically Signed by: Swetha Pradhan-, Other -Author on April 12, 2021 02:58:09 PM  Electronically Co-signed by: IRIS Espinoza  -Reviewer on April 12, 2021 03:42:44 PM

## 2021-05-15 VITALS
DIASTOLIC BLOOD PRESSURE: 80 MMHG | HEIGHT: 70 IN | WEIGHT: 250 LBS | BODY MASS INDEX: 35.79 KG/M2 | SYSTOLIC BLOOD PRESSURE: 130 MMHG | HEART RATE: 54 BPM

## 2021-05-15 VITALS
HEIGHT: 70 IN | OXYGEN SATURATION: 95 % | TEMPERATURE: 98.5 F | SYSTOLIC BLOOD PRESSURE: 122 MMHG | RESPIRATION RATE: 16 BRPM | HEART RATE: 54 BPM | DIASTOLIC BLOOD PRESSURE: 74 MMHG | WEIGHT: 243 LBS | BODY MASS INDEX: 34.79 KG/M2

## 2021-05-15 VITALS
HEART RATE: 80 BPM | WEIGHT: 250 LBS | BODY MASS INDEX: 35.79 KG/M2 | HEIGHT: 70 IN | SYSTOLIC BLOOD PRESSURE: 154 MMHG | DIASTOLIC BLOOD PRESSURE: 66 MMHG

## 2021-05-15 VITALS
BODY MASS INDEX: 36.08 KG/M2 | DIASTOLIC BLOOD PRESSURE: 67 MMHG | HEART RATE: 48 BPM | OXYGEN SATURATION: 98 % | HEIGHT: 70 IN | WEIGHT: 252 LBS | SYSTOLIC BLOOD PRESSURE: 127 MMHG | TEMPERATURE: 98 F

## 2021-05-15 VITALS
WEIGHT: 241 LBS | SYSTOLIC BLOOD PRESSURE: 118 MMHG | DIASTOLIC BLOOD PRESSURE: 58 MMHG | HEART RATE: 48 BPM | BODY MASS INDEX: 34.5 KG/M2 | HEIGHT: 70 IN

## 2021-05-16 VITALS
WEIGHT: 233 LBS | SYSTOLIC BLOOD PRESSURE: 118 MMHG | HEART RATE: 50 BPM | DIASTOLIC BLOOD PRESSURE: 62 MMHG | BODY MASS INDEX: 33.36 KG/M2 | HEIGHT: 70 IN

## 2021-05-16 VITALS
WEIGHT: 238 LBS | BODY MASS INDEX: 34.07 KG/M2 | HEIGHT: 70 IN | DIASTOLIC BLOOD PRESSURE: 68 MMHG | OXYGEN SATURATION: 97 % | TEMPERATURE: 97.7 F | SYSTOLIC BLOOD PRESSURE: 125 MMHG | RESPIRATION RATE: 16 BRPM | HEART RATE: 51 BPM

## 2021-05-16 VITALS
HEART RATE: 52 BPM | SYSTOLIC BLOOD PRESSURE: 132 MMHG | DIASTOLIC BLOOD PRESSURE: 68 MMHG | HEIGHT: 70 IN | BODY MASS INDEX: 34.36 KG/M2 | WEIGHT: 240 LBS

## 2021-05-16 VITALS
HEART RATE: 56 BPM | DIASTOLIC BLOOD PRESSURE: 74 MMHG | SYSTOLIC BLOOD PRESSURE: 130 MMHG | HEIGHT: 70 IN | WEIGHT: 239 LBS | BODY MASS INDEX: 34.22 KG/M2

## 2021-08-04 RX ORDER — LOSARTAN POTASSIUM 25 MG/1
TABLET ORAL
Qty: 30 TABLET | Refills: 8 | Status: SHIPPED | OUTPATIENT
Start: 2021-08-04 | End: 2022-05-20

## 2021-09-09 RX ORDER — NITROGLYCERIN 0.4 MG/1
TABLET SUBLINGUAL
Qty: 25 TABLET | Refills: 1 | Status: SHIPPED | OUTPATIENT
Start: 2021-09-09 | End: 2021-10-12 | Stop reason: SDUPTHER

## 2021-09-10 ENCOUNTER — OFFICE VISIT (OUTPATIENT)
Dept: FAMILY MEDICINE CLINIC | Facility: CLINIC | Age: 66
End: 2021-09-10

## 2021-09-10 VITALS
OXYGEN SATURATION: 99 % | DIASTOLIC BLOOD PRESSURE: 74 MMHG | SYSTOLIC BLOOD PRESSURE: 132 MMHG | BODY MASS INDEX: 36.79 KG/M2 | HEART RATE: 63 BPM | HEIGHT: 70 IN | TEMPERATURE: 98.3 F | WEIGHT: 257 LBS

## 2021-09-10 DIAGNOSIS — Z00.00 WELL ADULT EXAM: ICD-10-CM

## 2021-09-10 DIAGNOSIS — Z12.11 SCREENING FOR COLON CANCER: ICD-10-CM

## 2021-09-10 DIAGNOSIS — E78.5 HYPERLIPIDEMIA, UNSPECIFIED HYPERLIPIDEMIA TYPE: ICD-10-CM

## 2021-09-10 DIAGNOSIS — I10 ESSENTIAL HYPERTENSION: ICD-10-CM

## 2021-09-10 DIAGNOSIS — Z12.5 SCREENING FOR PROSTATE CANCER: Primary | ICD-10-CM

## 2021-09-10 DIAGNOSIS — L40.50 PSORIATIC ARTHRITIS (HCC): ICD-10-CM

## 2021-09-10 PROCEDURE — 99397 PER PM REEVAL EST PAT 65+ YR: CPT | Performed by: NURSE PRACTITIONER

## 2021-09-10 RX ORDER — METRONIDAZOLE 7.5 MG/G
GEL TOPICAL 2 TIMES DAILY
COMMUNITY

## 2021-09-10 RX ORDER — CLOBETASOL PROPIONATE 0.5 MG/G
1 OINTMENT TOPICAL 2 TIMES DAILY
COMMUNITY

## 2021-09-10 NOTE — PROGRESS NOTES
"Chief Complaint  Annual Exam    Subjective          Yoan Franco Jr. presents to [facility]  Presents to the office today wellness physical.  Patient denies needing refills on any of his medications.  Did explain to the patient that he is due for Cologuard as well as lab work.  I explained that we would obtain a PSA for his prostate cancer screening.  He denies any concerns or complaints at this time      Objective   Vital Signs:   /74   Pulse 63   Temp 98.3 °F (36.8 °C)   Ht 177.8 cm (70\")   Wt 117 kg (257 lb)   SpO2 99%   BMI 36.88 kg/m²     Physical Exam  Vitals reviewed.   Constitutional:       Appearance: Normal appearance.   HENT:      Head: Normocephalic and atraumatic.      Right Ear: Tympanic membrane, ear canal and external ear normal.      Left Ear: Tympanic membrane, ear canal and external ear normal.   Eyes:      Extraocular Movements: Extraocular movements intact.      Conjunctiva/sclera: Conjunctivae normal.      Pupils: Pupils are equal, round, and reactive to light.   Cardiovascular:      Rate and Rhythm: Normal rate and regular rhythm.      Heart sounds: Normal heart sounds, S1 normal and S2 normal. No murmur heard.     Pulmonary:      Effort: Pulmonary effort is normal. No respiratory distress.      Breath sounds: Normal breath sounds.   Abdominal:      General: Abdomen is flat. Bowel sounds are normal.      Palpations: Abdomen is soft.   Musculoskeletal:      Cervical back: Normal range of motion and neck supple.   Skin:     General: Skin is warm and dry.      Comments: Deformity noted to his hands bilaterally secondary to psoriatic arthritis   Neurological:      Mental Status: He is alert and oriented to person, place, and time.   Psychiatric:         Attention and Perception: Attention normal.         Mood and Affect: Mood normal.         Behavior: Behavior normal.        Result Review :                Assessment and Plan    Diagnoses and all " orders for this visit:    1. Screening for prostate cancer (Primary)  -     PSA SCREENING; Future    2. Hyperlipidemia, unspecified hyperlipidemia type  -     CBC (No Diff); Future  -     Comprehensive Metabolic Panel; Future  -     Lipid Panel; Future  -     TSH+Free T4; Future    3. Essential hypertension  -     CBC (No Diff); Future  -     Comprehensive Metabolic Panel; Future  -     TSH+Free T4; Future    4. Psoriatic arthritis (CMS/HCC)  -     CBC (No Diff); Future  -     Comprehensive Metabolic Panel; Future    5. Well adult exam    6. Screening for colon cancer  -     Cologuard - Stool, Per Rectum; Future    Other orders  -     Cancel: CBC (No Diff); Future  -     Cancel: Comprehensive Metabolic Panel; Future  -     Cancel: Lipid Panel; Future  -     Cancel: PSA SCREENING; Future  -     Cancel: Cologuard - Stool, Per Rectum; Future    Preventative counseling includes healthy diet and exercise, colon cancer screening prostate cancer screening    Follow Up   Return in about 1 year (around 9/10/2022) for Annual physical.  Patient was given instructions and counseling regarding his condition or for health maintenance advice. Please see specific information pulled into the AVS if appropriate.

## 2021-10-12 RX ORDER — NITROGLYCERIN 0.4 MG/1
0.4 TABLET SUBLINGUAL
Qty: 25 TABLET | Refills: 1 | Status: SHIPPED | OUTPATIENT
Start: 2021-10-12 | End: 2022-11-04 | Stop reason: SDUPTHER

## 2021-10-12 RX ORDER — CARVEDILOL 3.12 MG/1
TABLET ORAL
Qty: 90 TABLET | Refills: 1 | Status: SHIPPED | OUTPATIENT
Start: 2021-10-12 | End: 2022-01-12 | Stop reason: SDUPTHER

## 2022-01-06 ENCOUNTER — CLINICAL SUPPORT (OUTPATIENT)
Dept: FAMILY MEDICINE CLINIC | Facility: CLINIC | Age: 67
End: 2022-01-06

## 2022-01-06 DIAGNOSIS — Z23 NEED FOR INFLUENZA VACCINATION: Primary | ICD-10-CM

## 2022-01-06 PROCEDURE — 90662 IIV NO PRSV INCREASED AG IM: CPT | Performed by: NURSE PRACTITIONER

## 2022-01-06 PROCEDURE — 90471 IMMUNIZATION ADMIN: CPT | Performed by: NURSE PRACTITIONER

## 2022-01-11 PROBLEM — I25.10 CORONARY ARTERY DISEASE INVOLVING NATIVE HEART WITHOUT ANGINA PECTORIS: Chronic | Status: ACTIVE | Noted: 2022-01-11

## 2022-01-11 PROBLEM — I25.10 CORONARY ARTERY DISEASE INVOLVING NATIVE HEART WITHOUT ANGINA PECTORIS: Status: ACTIVE | Noted: 2022-01-11

## 2022-01-11 PROBLEM — I10 ESSENTIAL HYPERTENSION: Status: ACTIVE | Noted: 2018-04-23

## 2022-01-11 PROBLEM — E78.5 HYPERLIPIDEMIA: Status: ACTIVE | Noted: 2018-04-23

## 2022-01-12 ENCOUNTER — OFFICE VISIT (OUTPATIENT)
Dept: CARDIOLOGY | Facility: CLINIC | Age: 67
End: 2022-01-12

## 2022-01-12 VITALS
HEIGHT: 70 IN | SYSTOLIC BLOOD PRESSURE: 134 MMHG | DIASTOLIC BLOOD PRESSURE: 72 MMHG | HEART RATE: 60 BPM | BODY MASS INDEX: 37.37 KG/M2 | WEIGHT: 261 LBS

## 2022-01-12 DIAGNOSIS — E66.01 MORBID (SEVERE) OBESITY DUE TO EXCESS CALORIES: ICD-10-CM

## 2022-01-12 DIAGNOSIS — I34.0 NONRHEUMATIC MITRAL VALVE REGURGITATION: ICD-10-CM

## 2022-01-12 DIAGNOSIS — E78.5 HYPERLIPIDEMIA, UNSPECIFIED HYPERLIPIDEMIA TYPE: ICD-10-CM

## 2022-01-12 DIAGNOSIS — I25.10 CORONARY ARTERY DISEASE INVOLVING NATIVE HEART WITHOUT ANGINA PECTORIS, UNSPECIFIED VESSEL OR LESION TYPE: Primary | ICD-10-CM

## 2022-01-12 DIAGNOSIS — I10 ESSENTIAL HYPERTENSION: ICD-10-CM

## 2022-01-12 PROCEDURE — 99214 OFFICE O/P EST MOD 30 MIN: CPT | Performed by: INTERNAL MEDICINE

## 2022-01-12 RX ORDER — CARVEDILOL 3.12 MG/1
3.12 TABLET ORAL DAILY
Qty: 30 TABLET | Refills: 11 | Status: SHIPPED | OUTPATIENT
Start: 2022-01-12 | End: 2023-02-13

## 2022-01-12 NOTE — PROGRESS NOTES
Office Visit    Chief Complaint  Coronary Artery Disease    Subjective            Yoan Franco . presents to [facility]  Haresh is a 66 years old gentleman with coronary disease status post my myocardial infarction, status post stent PCI to the LAD who is doing very well.  He denies any chest pain shortness of breath paroxysmal nocturnal dyspnea orthopnea palpitations dizziness or syncope.  Lately hasn't been exercising much      Past Medical History:   Diagnosis Date   • Allergies    • Ankle pain, left 07/27/2020   • Arthritis    • Coronary artery disease involving native heart without angina pectoris 1/11/2022    with previous MI and stent/PCI to the LAD (July 2016)   • Deafness    • Essential hypertension 04/23/2018   • Hammertoe    • Heart attack (HCC)    • Heel pain    • Hemorrhoids    • HLD (hyperlipidemia) 04/23/2018   • Ingrown toenail    • Kidney disease    • Myocardial infarction (HCC)    • Skin disease        Allergies   Allergen Reactions   • Corticosteroids Mental Status Change   • Latex Rash        Past Surgical History:   Procedure Laterality Date   • CORONARY ANGIOPLASTY WITH STENT PLACEMENT  2016   • KNEE MENISCAL REPAIR Left    • OTHER SURGICAL HISTORY      Joint surgery        Social History     Tobacco Use   • Smoking status: Former Smoker   • Smokeless tobacco: Never Used   Vaping Use   • Vaping Use: Never used   Substance Use Topics   • Alcohol use: Yes   • Drug use: Never       Family History   Problem Relation Age of Onset   • Breast cancer Other    • Pancreatic cancer Other    • Stroke Other    • Heart disease Other    • Diabetes type II Other    • Pancreatic cancer Mother    • Heart disease Father           Current Outpatient Medications:   •  ascorbic acid (VITAMIN C) 500 MG tablet, Take 500 mg by mouth Daily., Disp: , Rfl:   •  aspirin 81 MG chewable tablet, Chew 81 mg Daily., Disp: , Rfl:   •  atorvastatin (LIPITOR) 20 MG tablet, Take 20 mg by mouth  "Daily., Disp: , Rfl:   •  carvedilol (COREG) 3.125 MG tablet, Take 1 tablet by mouth Daily., Disp: 30 tablet, Rfl: 11  •  clobetasol (TEMOVATE) 0.05 % ointment, Apply 1 application topically to the appropriate area as directed 2 (Two) Times a Day., Disp: , Rfl:   •  coenzyme Q10 100 MG capsule, Take 100 mg by mouth Daily., Disp: , Rfl:   •  folic acid (FOLVITE) 1 MG tablet, Take 1 mg by mouth Daily., Disp: , Rfl:   •  folic acid-vit B6-vit B12 (FOLTABS) 0.8-10-0.115 MG tablet tablet, Take  by mouth Daily., Disp: , Rfl:   •  losartan (COZAAR) 25 MG tablet, TAKE ONE TABLET BY MOUTH EVERY DAY, Disp: 30 tablet, Rfl: 8  •  magnesium oxide (MAGOX) 400 (241.3 Mg) MG tablet tablet, Take 400 mg by mouth Daily., Disp: , Rfl:   •  metroNIDAZOLE (METROGEL) 0.75 % gel, Apply  topically to the appropriate area as directed 2 (Two) Times a Day., Disp: , Rfl:   •  nitroglycerin (NITROSTAT) 0.4 MG SL tablet, Place 1 tablet under the tongue Every 5 (Five) Minutes As Needed for Chest Pain. Take no more than 3 doses in 15 minutes., Disp: 25 tablet, Rfl: 1  •  spironolactone (ALDACTONE) 25 MG tablet, Take 25 mg by mouth Daily. Every other day, Disp: , Rfl:   •  methotrexate 2.5 MG tablet, Take 2.5 mg by mouth 1 (One) Time Per Week., Disp: , Rfl:      Medications Discontinued During This Encounter   Medication Reason   • carvedilol (COREG) 3.125 MG tablet Reorder        Review of Systems   Respiratory: Negative for cough and shortness of breath.    Cardiovascular: Negative for chest pain, palpitations and leg swelling.        Objective     /72 (Patient Position: Sitting)   Pulse 60   Ht 177.8 cm (70\")   Wt 118 kg (261 lb)   BMI 37.45 kg/m²       Physical Exam  Constitutional:       General: He is awake.      Appearance: Normal appearance.   Neck:      Thyroid: No thyromegaly.      Vascular: No carotid bruit or JVD.   Cardiovascular:      Rate and Rhythm: Normal rate and regular rhythm.      Chest Wall: PMI is not displaced.      " Pulses: Normal pulses.      Heart sounds: S1 normal and S2 normal. Murmur heard.    Systolic murmur is present.  No friction rub. No gallop. No S3 or S4 sounds.    Pulmonary:      Effort: Pulmonary effort is normal.      Breath sounds: Normal breath sounds and air entry. No wheezing, rhonchi or rales.   Abdominal:      General: Bowel sounds are normal.      Palpations: Abdomen is soft. There is no mass.      Tenderness: There is no abdominal tenderness.   Musculoskeletal:      Cervical back: Neck supple.      Right lower leg: No edema.      Left lower leg: No edema.   Neurological:      Mental Status: He is alert and oriented to person, place, and time.   Psychiatric:         Mood and Affect: Mood normal.         Behavior: Behavior is cooperative.           Result Review :                      No results found for: PROBNP, BNP    Outside labs were reviewed LDL is at goal at 62 HDL is 56 CBC chemistry panel is normal TSH is 4.4 Free T4 is 0.95. His blood pressure log looks excellent     No results found for: TSH   No results found for: FREET4   No results found for: DDIMERQUANT  No results found for: MG   No results found for: DIGOXIN               Assessment and Plan        Diagnoses and all orders for this visit:    1. Coronary artery disease involving native heart without angina pectoris, unspecified vessel or lesion type (Primary)  -     Adult Transthoracic Echo Complete W/ Cont if Necessary Per Protocol; Future    2. Essential hypertension  -     Adult Transthoracic Echo Complete W/ Cont if Necessary Per Protocol; Future    3. Hyperlipidemia, unspecified hyperlipidemia type  -     Adult Transthoracic Echo Complete W/ Cont if Necessary Per Protocol; Future    4. Nonrheumatic mitral valve regurgitation  -     Adult Transthoracic Echo Complete W/ Cont if Necessary Per Protocol; Future    5. Morbid (severe) obesity due to excess calories (HCC)    Other orders  -     carvedilol (COREG) 3.125 MG tablet; Take 1 tablet  by mouth Daily.  Dispense: 30 tablet; Refill: 11      We will do an echocardiogram in the next month or so to evaluate his underlying mitral valve disease      Follow Up     Return in about 9 months (around 10/12/2022) for echo in 1-2 months, Labs with outside lab ordered.    Patient was given instructions and counseling regarding his condition or for health maintenance advice. Please see specific information pulled into the AVS if appropriate.     Nevaeh Lovelace MD  01/12/22 15:15 EST

## 2022-01-13 ENCOUNTER — TELEPHONE (OUTPATIENT)
Dept: CARDIOLOGY | Facility: CLINIC | Age: 67
End: 2022-01-13

## 2022-01-13 NOTE — TELEPHONE ENCOUNTER
Received VM from patient.     Returned call. Patient just wanted clarification on why echo was being done. Advised per officce note it was to assess mitral valve.

## 2022-04-04 RX ORDER — ATORVASTATIN CALCIUM 20 MG/1
TABLET, FILM COATED ORAL
Qty: 90 TABLET | Refills: 3 | Status: SHIPPED | OUTPATIENT
Start: 2022-04-04 | End: 2022-11-04 | Stop reason: SDUPTHER

## 2022-04-04 RX ORDER — SPIRONOLACTONE 25 MG/1
TABLET ORAL
Qty: 45 TABLET | Refills: 3 | Status: SHIPPED | OUTPATIENT
Start: 2022-04-04 | End: 2022-11-04 | Stop reason: SDUPTHER

## 2022-05-20 RX ORDER — LOSARTAN POTASSIUM 25 MG/1
TABLET ORAL
Qty: 30 TABLET | Refills: 11 | Status: SHIPPED | OUTPATIENT
Start: 2022-05-20

## 2022-10-24 ENCOUNTER — TELEPHONE (OUTPATIENT)
Dept: FAMILY MEDICINE CLINIC | Facility: CLINIC | Age: 67
End: 2022-10-24

## 2022-10-24 DIAGNOSIS — I10 ESSENTIAL HYPERTENSION: ICD-10-CM

## 2022-10-24 DIAGNOSIS — Z12.5 SCREENING FOR PROSTATE CANCER: Primary | ICD-10-CM

## 2022-10-24 DIAGNOSIS — E78.5 HYPERLIPIDEMIA, UNSPECIFIED HYPERLIPIDEMIA TYPE: ICD-10-CM

## 2022-10-24 NOTE — TELEPHONE ENCOUNTER
Caller: Yoan Franco Jr.    Relationship: Self    Best call back number: [phone]    What orders are you requesting (i.e. lab or imaging): PSA LAB    In what timeframe would the patient need to come in: TOMORROW EARLY AM    Where will you receive your lab/imaging services: [facility]    Additional notes: PATIENT IS DOING LAB WORK TOMORROW FOR ANOTHER PROVIDER. PATIENT IS REQUESTING HIS PSA LAB ORDER TO BE PLACED FOR TOMORROW. HE IS GOING EARLY MORNING FOR HIS LABS TO BE DRAWN.

## 2022-11-04 ENCOUNTER — OFFICE VISIT (OUTPATIENT)
Dept: CARDIOLOGY | Facility: CLINIC | Age: 67
End: 2022-11-04

## 2022-11-04 VITALS
WEIGHT: 262 LBS | BODY MASS INDEX: 36.68 KG/M2 | HEART RATE: 66 BPM | HEIGHT: 71 IN | SYSTOLIC BLOOD PRESSURE: 137 MMHG | DIASTOLIC BLOOD PRESSURE: 70 MMHG

## 2022-11-04 DIAGNOSIS — I10 ESSENTIAL HYPERTENSION: ICD-10-CM

## 2022-11-04 DIAGNOSIS — I25.10 CORONARY ARTERY DISEASE INVOLVING NATIVE HEART WITHOUT ANGINA PECTORIS, UNSPECIFIED VESSEL OR LESION TYPE: Primary | ICD-10-CM

## 2022-11-04 DIAGNOSIS — E78.5 HYPERLIPIDEMIA, UNSPECIFIED HYPERLIPIDEMIA TYPE: ICD-10-CM

## 2022-11-04 PROCEDURE — 99213 OFFICE O/P EST LOW 20 MIN: CPT

## 2022-11-04 RX ORDER — ATORVASTATIN CALCIUM 20 MG/1
20 TABLET, FILM COATED ORAL
Qty: 90 TABLET | Refills: 3 | Status: SHIPPED | OUTPATIENT
Start: 2022-11-04

## 2022-11-04 RX ORDER — SPIRONOLACTONE 25 MG/1
25 TABLET ORAL EVERY OTHER DAY
Qty: 45 TABLET | Refills: 3 | Status: SHIPPED | OUTPATIENT
Start: 2022-11-04

## 2022-11-04 RX ORDER — NITROGLYCERIN 0.4 MG/1
0.4 TABLET SUBLINGUAL
Qty: 25 TABLET | Refills: 1 | Status: SHIPPED | OUTPATIENT
Start: 2022-11-04

## 2022-11-04 NOTE — PROGRESS NOTES
Chief Complaint  Coronary Artery Disease and Hypertension    Subjective        History of Present Illness  Yoan Franco Jr. presents to [facility]   Yoan is a 67-year-old  male presents for follow-up.  He has a past medical history significant for coronary artery disease status post myocardial infarction in 2016 with PCI to the LAD.  He reports he has been doing well and has no complaints today.  He denies any chest pain, shortness of breath, paroxysmal nocturnal dyspnea, orthopnea, palpitations, dizziness or syncope.  [facility]  Past Medical History:   Diagnosis Date   • Allergies    • Ankle pain, left 07/27/2020   • Arthritis    • Coronary artery disease involving native heart without angina pectoris 1/11/2022    with previous MI and stent/PCI to the LAD (July 2016)   • Deafness    • Essential hypertension 04/23/2018   • Hammertoe    • Heart attack (HCC)    • Heel pain    • Hemorrhoids    • HLD (hyperlipidemia) 04/23/2018   • Ingrown toenail    • Kidney disease    • Myocardial infarction (HCC)    • Skin disease          ALLERGY  Allergies   Allergen Reactions   • Corticosteroids Mental Status Change   • Latex Rash          SURGICALHX  Past Surgical History:   Procedure Laterality Date   • CORONARY ANGIOPLASTY WITH STENT PLACEMENT  2016   • KNEE MENISCAL REPAIR Left    • OTHER SURGICAL HISTORY      Joint surgery          SOC  Social History     Socioeconomic History   • Marital status:    Tobacco Use   • Smoking status: Former   • Smokeless tobacco: Never   Vaping Use   • Vaping Use: Never used   Substance and Sexual Activity   • Alcohol use: Yes   • Drug use: Never   • Sexual activity: Defer         FAMHX  Family History   Problem Relation Age of Onset   • Breast cancer Other    • Pancreatic cancer Other    • Stroke Other    • Heart disease Other    • Diabetes type II Other    • Pancreatic cancer Mother    • Heart disease Father           MEDSIGONLY  Current  "Outpatient Medications on File Prior to Visit   Medication Sig   • Alpha-Lipoic Acid 600 MG tablet Take  by mouth.   • ascorbic acid (VITAMIN C) 500 MG tablet Take 500 mg by mouth Daily.   • aspirin 81 MG chewable tablet Chew 81 mg Daily.   • carvedilol (COREG) 3.125 MG tablet Take 1 tablet by mouth Daily.   • clobetasol (TEMOVATE) 0.05 % ointment Apply 1 application topically to the appropriate area as directed 2 (Two) Times a Day.   • coenzyme Q10 100 MG capsule Take 100 mg by mouth Daily.   • folic acid (FOLVITE) 1 MG tablet Take 1 mg by mouth Daily.   • folic acid-vit B6-vit B12 (FOLTABS) 0.8-10-0.115 MG tablet tablet Take  by mouth Daily.   • losartan (COZAAR) 25 MG tablet TAKE ONE TABLET BY MOUTH EVERY DAY   • magnesium oxide (MAGOX) 400 (241.3 Mg) MG tablet tablet Take 400 mg by mouth Daily.   • methotrexate 2.5 MG tablet Take 2.5 mg by mouth 1 (One) Time Per Week.   • metroNIDAZOLE (METROGEL) 0.75 % gel Apply  topically to the appropriate area as directed 2 (Two) Times a Day.   • [DISCONTINUED] atorvastatin (LIPITOR) 20 MG tablet TAKE ONE TABLET BY MOUTH EVERY NIGHT AT BEDTIME   • [DISCONTINUED] nitroglycerin (NITROSTAT) 0.4 MG SL tablet Place 1 tablet under the tongue Every 5 (Five) Minutes As Needed for Chest Pain. Take no more than 3 doses in 15 minutes.   • [DISCONTINUED] spironolactone (ALDACTONE) 25 MG tablet TAKE ONE TABLET BY MOUTH EVERY OTHER DAY     No current facility-administered medications on file prior to visit.         Objective   Vitals:    11/04/22 1450 11/04/22 1456   BP: 144/71 137/70   Pulse: 73 66   Weight: 119 kg (262 lb)    Height: 180.3 cm (71\")          Physical Exam  Constitutional:       General: He is awake. He is not in acute distress.     Appearance: Normal appearance.   HENT:      Head: Normocephalic.      Nose: Nose normal. No congestion.   Eyes:      Extraocular Movements: Extraocular movements intact.      Conjunctiva/sclera: Conjunctivae normal.      Pupils: Pupils are " equal, round, and reactive to light.   Neck:      Thyroid: No thyromegaly.      Vascular: No JVD.   Cardiovascular:      Rate and Rhythm: Normal rate and regular rhythm.      Chest Wall: PMI is not displaced.      Pulses: Normal pulses.      Heart sounds: Normal heart sounds, S1 normal and S2 normal. No murmur heard.    No friction rub. No gallop. No S3 or S4 sounds.   Pulmonary:      Effort: Pulmonary effort is normal.      Breath sounds: Normal breath sounds. No wheezing, rhonchi or rales.   Abdominal:      General: Bowel sounds are normal.      Palpations: Abdomen is soft.      Tenderness: There is no abdominal tenderness.   Musculoskeletal:      Cervical back: No tenderness.      Right lower leg: No edema.      Left lower leg: No edema.   Lymphadenopathy:      Cervical: No cervical adenopathy.   Skin:     General: Skin is warm and dry.      Capillary Refill: Capillary refill takes less than 2 seconds.      Coloration: Skin is not cyanotic.      Findings: No petechiae or rash.      Nails: There is no clubbing.   Neurological:      Mental Status: He is alert.   Psychiatric:         Mood and Affect: Mood normal.         Behavior: Behavior is cooperative.           Result Review     The following data was reviewed by IRIS Sheppard on 11/04/22.    No results found for: PROBNP       No results found for: TSH       Results for orders placed in visit on 02/08/22    Adult Transthoracic Echo Complete W/ Cont if Necessary Per Protocol    Interpretation Summary  · The left ventricular cavity is dilated.  · Estimated left ventricular EF was in agreement with the calculated left ventricular EF. Left ventricular ejection fraction appears to be 61 - 65%. Left ventricular systolic function is normal.  · Left ventricular diastolic function is consistent with (grade II w/high LAP) pseudonormalization.  · Mild dilation of the aortic root is present.  · Mild mitral and mild aortic valve regurgitation is present              Assessment and Plan   Diagnoses and all orders for this visit:    1. Coronary artery disease involving native heart without angina pectoris, unspecified vessel or lesion type (Primary)  -     nitroglycerin (NITROSTAT) 0.4 MG SL tablet; Place 1 tablet under the tongue Every 5 (Five) Minutes As Needed for Chest Pain. Take no more than 3 doses in 15 minutes.  Dispense: 25 tablet; Refill: 1    2. Essential hypertension  -     spironolactone (ALDACTONE) 25 MG tablet; Take 1 tablet by mouth Every Other Day.  Dispense: 45 tablet; Refill: 3    3. Hyperlipidemia, unspecified hyperlipidemia type  -     atorvastatin (LIPITOR) 20 MG tablet; Take 1 tablet by mouth every night at bedtime.  Dispense: 90 tablet; Refill: 3        1.  Coronary artery disease status post PCI to the LAD in July 2016.  Asymptomatic without chest pain shortness of breath.  Continue 81 mg aspirin daily.  Continue carvedilol 3.125 mg daily.  Nitroglycerin as needed but reports he has not needed it at all.  Refills sent.  2.  Primary hypertension-blood pressure is a little elevated in the office today but he presents with a blood pressure log that demonstrates normal blood pressure readings at home in the low 110s systolically.  Continue Aldactone 25 mg daily and losartan 25 mg daily.  Advised to increase exercise and decrease sodium and continue with heart healthy diet.  3.  Hyperlipidemia-well-controlled on lipid panel from November 1, 2022.  Continue atorvastatin 20 mg daily.  Plan to see him back in 1 year unless problems arise.    Follow Up   Return in about 1 year (around 11/4/2023) for With .    Patient was given instructions and counseling regarding his condition or for health maintenance advice. Please see specific information pulled into the AVS if appropriate.     Leatha Alvarado, APRN  11/04/22  15:03 EDT    Dictated Utilizing Dragon Dictation

## 2022-12-09 ENCOUNTER — OFFICE VISIT (OUTPATIENT)
Dept: FAMILY MEDICINE CLINIC | Facility: CLINIC | Age: 67
End: 2022-12-09

## 2022-12-09 VITALS
BODY MASS INDEX: 36.06 KG/M2 | DIASTOLIC BLOOD PRESSURE: 68 MMHG | HEIGHT: 71 IN | WEIGHT: 257.6 LBS | SYSTOLIC BLOOD PRESSURE: 126 MMHG | RESPIRATION RATE: 16 BRPM | TEMPERATURE: 96.3 F | OXYGEN SATURATION: 97 % | HEART RATE: 74 BPM

## 2022-12-09 DIAGNOSIS — Z12.5 SCREENING FOR PROSTATE CANCER: ICD-10-CM

## 2022-12-09 DIAGNOSIS — Z00.00 WELL ADULT EXAM: ICD-10-CM

## 2022-12-09 DIAGNOSIS — R63.5 WEIGHT GAIN: ICD-10-CM

## 2022-12-09 DIAGNOSIS — Z23 NEED FOR INFLUENZA VACCINATION: Primary | ICD-10-CM

## 2022-12-09 PROCEDURE — 99397 PER PM REEVAL EST PAT 65+ YR: CPT | Performed by: NURSE PRACTITIONER

## 2022-12-09 PROCEDURE — 90471 IMMUNIZATION ADMIN: CPT | Performed by: NURSE PRACTITIONER

## 2022-12-09 PROCEDURE — 90662 IIV NO PRSV INCREASED AG IM: CPT | Performed by: NURSE PRACTITIONER

## 2022-12-09 NOTE — PROGRESS NOTES
"Chief Complaint  Annual Exam    Subjective         Yoan Franco Jr. presents to [facility]  Patient presents to the office today for an annual exam.  He states that he is doing well although he has concerns over weight gain.  He states that he knows he needs to be more active and improve his diet.  Patient does state that he has questions regarding metabolic syndrome.  I did discuss that we could evaluate this through his labs.  He states that he would like to do this.  He denies any concerns or complaints at this time.  He denies needing med refills as well.       Objective     Vitals:    12/09/22 0653   BP: 126/68   BP Location: Right arm   Patient Position: Sitting   Cuff Size: Adult   Pulse: 74   Resp: 16   Temp: 96.3 °F (35.7 °C)   TempSrc: Temporal   SpO2: 97%   Weight: 117 kg (257 lb 9.6 oz)   Height: 180.3 cm (70.98\")      Body mass index is 35.94 kg/m².    Class 2 Severe Obesity (BMI >=35 and <=39.9). Obesity-related health conditions include the following: dyslipidemias. Obesity is unchanged. BMI is is above average; no BMI management plan is appropriate. We discussed portion control and increasing exercise.        Physical Exam  Vitals reviewed.   Constitutional:       Appearance: Normal appearance.   HENT:      Head: Normocephalic and atraumatic.      Right Ear: Tympanic membrane, ear canal and external ear normal.      Left Ear: Tympanic membrane, ear canal and external ear normal.      Nose: Nose normal.      Mouth/Throat:      Mouth: Mucous membranes are moist.      Pharynx: Oropharynx is clear.   Eyes:      Extraocular Movements: Extraocular movements intact.      Conjunctiva/sclera: Conjunctivae normal.      Pupils: Pupils are equal, round, and reactive to light.   Cardiovascular:      Rate and Rhythm: Normal rate and regular rhythm.      Pulses: Normal pulses.      Heart sounds: Normal heart sounds.   Pulmonary:      Effort: Pulmonary effort is normal.      " Breath sounds: Normal breath sounds.   Abdominal:      General: Abdomen is flat. Bowel sounds are normal.      Palpations: Abdomen is soft.   Musculoskeletal:         General: Normal range of motion.      Cervical back: Normal range of motion and neck supple.   Skin:     General: Skin is warm and dry.   Neurological:      General: No focal deficit present.      Mental Status: He is alert and oriented to person, place, and time.   Psychiatric:         Mood and Affect: Mood normal.         Behavior: Behavior normal.          Result Review :   The following data was reviewed by: IRIS Spivey on 12/09/2022:      Procedures    Assessment and Plan   Diagnoses and all orders for this visit:    1. Need for influenza vaccination (Primary)  -     Fluzone High-Dose 65+yrs (2446-2000)    2. Well adult exam  -     Cancel: CBC (No Diff); Future  -     Cancel: Comprehensive Metabolic Panel; Future  -     Cancel: Lipid Panel; Future  -     Cancel: TSH+Free T4; Future    3. Weight gain  -     Cancel: Insulin, Total; Future  -     Insulin, Total; Future    4. Screening for prostate cancer  -     Cancel: PSA SCREENING; Future      Patient recently had labs completed at [facility].  These are scanned in under media.  I did review these with the patient.    Prvenative counseling includes healthy diet and exercise.  I also discussed colon cancer screening as well as prostate cancer screening    Follow Up   Return in about 6 months (around 6/9/2023) for Recheck.  Patient was given instructions and counseling regarding his condition or for health maintenance advice. Please see specific information pulled into the AVS if appropriate.

## 2022-12-16 ENCOUNTER — TELEPHONE (OUTPATIENT)
Dept: FAMILY MEDICINE CLINIC | Facility: CLINIC | Age: 67
End: 2022-12-16

## 2022-12-16 NOTE — TELEPHONE ENCOUNTER
Caller: Yoan Franco Jr.    Relationship: Self    Best call back number: [phone]    What test was performed: BLOOD WORK     When was the test performed: 12.13.2022    Where was the test performed: [facility]    Additional notes: PLEASE CALL AND DISCUSS THESE RESULTS WITH PATIENT.

## 2023-02-13 RX ORDER — CARVEDILOL 3.12 MG/1
TABLET ORAL
Qty: 30 TABLET | Refills: 11 | Status: SHIPPED | OUTPATIENT
Start: 2023-02-13

## 2023-04-17 ENCOUNTER — OFFICE VISIT (OUTPATIENT)
Dept: FAMILY MEDICINE CLINIC | Facility: CLINIC | Age: 68
End: 2023-04-17
Payer: MEDICARE

## 2023-04-17 VITALS
RESPIRATION RATE: 17 BRPM | SYSTOLIC BLOOD PRESSURE: 136 MMHG | HEART RATE: 103 BPM | DIASTOLIC BLOOD PRESSURE: 68 MMHG | OXYGEN SATURATION: 96 %

## 2023-04-17 DIAGNOSIS — R42 DIZZINESS: ICD-10-CM

## 2023-04-17 DIAGNOSIS — J30.2 SEASONAL ALLERGIC RHINITIS, UNSPECIFIED TRIGGER: ICD-10-CM

## 2023-04-17 DIAGNOSIS — G44.201 ACUTE INTRACTABLE TENSION-TYPE HEADACHE: ICD-10-CM

## 2023-04-17 DIAGNOSIS — H69.81 DYSFUNCTION OF RIGHT EUSTACHIAN TUBE: Primary | ICD-10-CM

## 2023-04-17 RX ORDER — PREDNISONE 20 MG/1
20 TABLET ORAL DAILY
Qty: 5 TABLET | Refills: 0 | Status: SHIPPED | OUTPATIENT
Start: 2023-04-17

## 2023-04-17 NOTE — PROGRESS NOTES
Yoan Franco Jr. presents to [facility] with complaints of headache, right ear ringing, feelings like he might pass out.      History of Present Illness  This is a 67-year-old male who presents to the clinic with complaints of headache, right ear ringing, and feelings like he might pass out.    Patient states that he has had 2 episodes, one that happened 6 to 7 weeks ago, and then when that happened yesterday and with the second episode he became concerned and decided to need to come in for evaluation.  Patient states that with both of these episodes, he would get a sharp pain right above his ear near his temporal area, he would then subsequently have ear ringing (does have a history of tinnitus), would then develop a headache, and then feel like he was going to pass out.  Patient states these episodes would only last a few seconds, and then symptoms would resolve.  Patient states that the headache would then linger for a period of time.  Patient states that this is only happened twice, with these episodes, there is not been any other symptoms.  No chest pain, no shortness of breath.  States that he does not have any issues with any of the symptoms other than during the episodes.  Patient states that he has not had any weakness on one side versus the other, has not had any vision changes.  He does admit to some worsening sinus issues, states that he is definitely had some worsening sinus issues this spring that he has had in the past, is actually noticed more sneezing, some more sinus congestion, but does not admit openly to ear pain specifically.  Denies any fever/chills/body aches.  No other symptoms noted.    States that he is not started on any new medications, does not noted any other symptoms    The following portions of the patient's history were personally reviewed and updated as appropriate: allergies, current medications, past medical history, past surgical history,  past family history, and past social history.       Objective   Vital Signs:   /68   Pulse 103   Resp 17   SpO2 96%     There is no height or weight on file to calculate BMI.    All labs, imaging, test results, and specialty provider notes reviewed with patient.     Physical Exam  Vitals reviewed.   Constitutional:       Appearance: Normal appearance.   HENT:      Right Ear: Swelling present. Tympanic membrane is erythematous and bulging.      Left Ear: Hearing, tympanic membrane and ear canal normal.   Pulmonary:      Effort: Pulmonary effort is normal.   Neurological:      General: No focal deficit present.      Mental Status: He is alert and oriented to person, place, and time.              Assessment and Plan:  Diagnoses and all orders for this visit:    1. Dysfunction of right eustachian tube (Primary)  -     predniSONE (DELTASONE) 20 MG tablet; Take 1 tablet by mouth Daily.  Dispense: 5 tablet; Refill: 0    2. Dizziness  -     predniSONE (DELTASONE) 20 MG tablet; Take 1 tablet by mouth Daily.  Dispense: 5 tablet; Refill: 0    3. Acute intractable tension-type headache  -     predniSONE (DELTASONE) 20 MG tablet; Take 1 tablet by mouth Daily.  Dispense: 5 tablet; Refill: 0    4. Seasonal allergic rhinitis, unspecified trigger      Based on exam, does appear patient has quite a bit of fluid sent back behind his right tympanic membrane that would explain a lot of his symptoms.  Do question whether patient's episodes were related to worsening allergies, states that he has recently just started back on an antihistamine, but has not been taking consistently.  Discussed with him to start back on this, take daily, and consistently.  We will also give him a short course of prednisone.  Patient states that he did have issues with a Medrol Dosepak in the past, states that made him feel foggy, but that was with the several dosages in 1 day, so we will try him on prednisone 20 mg.  Discussed with him that if his  symptoms return with the foggy brain with the prednisone, he can discontinue at that time.  Did discuss that if symptoms fail to improve with this current work-up, more episodes happen, or any new symptoms develop, to please follow-up with myself or PCP.      Follow Up:  No follow-ups on file.    Patient was given instructions and counseling regarding his condition or for health maintenance advice. Please see specific information pulled into the AVS if appropriate.

## 2023-05-10 ENCOUNTER — TELEPHONE (OUTPATIENT)
Dept: CARDIOLOGY | Facility: CLINIC | Age: 68
End: 2023-05-10
Payer: MEDICARE

## 2023-05-10 RX ORDER — LOSARTAN POTASSIUM 25 MG/1
25 TABLET ORAL DAILY
Qty: 30 TABLET | Refills: 3 | Status: SHIPPED | OUTPATIENT
Start: 2023-05-10

## 2023-08-30 ENCOUNTER — EXTERNAL PBMM DATA (OUTPATIENT)
Dept: PHARMACY | Facility: OTHER | Age: 68
End: 2023-08-30
Payer: MEDICARE

## 2023-09-22 ENCOUNTER — TELEPHONE (OUTPATIENT)
Dept: CARDIOLOGY | Facility: CLINIC | Age: 68
End: 2023-09-22

## 2023-09-22 NOTE — TELEPHONE ENCOUNTER
Caller: DON    Relationship: SELF     Best call back number: [phone]    What is the best time to reach you: ANY    Who are you requesting to speak with (clinical staff, provider,  specific staff member): ANY    Do you know the name of the person who called:     What was the call regarding: PATIENT CALLING ASKING ABOUT LABS BEFORE UPCOMING APPOINTMENT. PLEASE MAIL PATIENT LAB ORDERS IF LABS ARE NEEDED. THANK YOU!    Is it okay if the provider responds through [employer]: NO

## 2023-10-05 DIAGNOSIS — E78.5 HYPERLIPIDEMIA, UNSPECIFIED HYPERLIPIDEMIA TYPE: ICD-10-CM

## 2023-10-05 DIAGNOSIS — I25.10 CORONARY ARTERY DISEASE INVOLVING NATIVE HEART WITHOUT ANGINA PECTORIS, UNSPECIFIED VESSEL OR LESION TYPE: ICD-10-CM

## 2023-10-05 RX ORDER — LOSARTAN POTASSIUM 25 MG/1
TABLET ORAL
Qty: 90 TABLET | Refills: 0 | Status: SHIPPED | OUTPATIENT
Start: 2023-10-05

## 2023-10-05 RX ORDER — ATORVASTATIN CALCIUM 20 MG/1
TABLET, FILM COATED ORAL
Qty: 90 TABLET | Refills: 0 | Status: SHIPPED | OUTPATIENT
Start: 2023-10-05

## 2023-10-05 RX ORDER — NITROGLYCERIN 0.4 MG/1
0.4 TABLET SUBLINGUAL
Qty: 25 TABLET | Refills: 1 | Status: SHIPPED | OUTPATIENT
Start: 2023-10-05

## 2023-11-20 ENCOUNTER — OFFICE VISIT (OUTPATIENT)
Dept: CARDIOLOGY | Facility: CLINIC | Age: 68
End: 2023-11-20
Payer: MEDICARE

## 2023-11-20 VITALS
DIASTOLIC BLOOD PRESSURE: 71 MMHG | SYSTOLIC BLOOD PRESSURE: 133 MMHG | HEART RATE: 51 BPM | HEIGHT: 71 IN | WEIGHT: 259.2 LBS | BODY MASS INDEX: 36.29 KG/M2

## 2023-11-20 DIAGNOSIS — I10 ESSENTIAL HYPERTENSION: ICD-10-CM

## 2023-11-20 DIAGNOSIS — E78.5 HYPERLIPIDEMIA, UNSPECIFIED HYPERLIPIDEMIA TYPE: ICD-10-CM

## 2023-11-20 DIAGNOSIS — I25.10 CORONARY ARTERY DISEASE INVOLVING NATIVE HEART WITHOUT ANGINA PECTORIS, UNSPECIFIED VESSEL OR LESION TYPE: Primary | ICD-10-CM

## 2023-11-20 DIAGNOSIS — I35.1 AORTIC VALVE INSUFFICIENCY, ETIOLOGY OF CARDIAC VALVE DISEASE UNSPECIFIED: ICD-10-CM

## 2023-11-20 DIAGNOSIS — I34.0 NONRHEUMATIC MITRAL VALVE REGURGITATION: ICD-10-CM

## 2023-11-20 PROCEDURE — 3078F DIAST BP <80 MM HG: CPT

## 2023-11-20 PROCEDURE — 1159F MED LIST DOCD IN RCRD: CPT

## 2023-11-20 PROCEDURE — 99214 OFFICE O/P EST MOD 30 MIN: CPT

## 2023-11-20 PROCEDURE — 1160F RVW MEDS BY RX/DR IN RCRD: CPT

## 2023-11-20 PROCEDURE — 3075F SYST BP GE 130 - 139MM HG: CPT

## 2023-11-20 RX ORDER — ELECTROLYTES/DEXTROSE
200 SOLUTION, ORAL ORAL DAILY
COMMUNITY

## 2023-11-20 RX ORDER — SACCHAROMYCES BOULARDII 250 MG
250 CAPSULE ORAL DAILY
COMMUNITY

## 2023-11-20 RX ORDER — VIT C/B6/B5/MAGNESIUM/HERB 173 50-5-6-5MG
1500 CAPSULE ORAL DAILY
COMMUNITY

## 2023-11-20 NOTE — PROGRESS NOTES
Chief Complaint  Hypertension, Hyperlipidemia, and Follow-up (1 year f/u.  Pt reports no issues.)    Subjective        History of Present Illness  Yoan Franco JrRupinder presents to [facility] for follow up.  Patient is a 68-year-old male with past medical history outlined below, significant for coronary artery disease status post MI in 2016 with PCI to the LAD, hypertension, hyperlipidemia who presents for follow-up.  He is doing well for the most part.  He does note when he overexerts himself he has an occasional sharp pain in his chest that only lasts a second and resolves on its own.  He states it correlates with the pain that occurs in his leg at the same time.  He states it does not feel like the pain and pressure he had during his heart attack.  He notes no shortness of breath.  He tries to exercise regularly.  He denies any dizziness, lightheadedness or palpitations.  He is compliant with his medications without adverse effects.      Past Medical History:   Diagnosis Date    Allergies     Ankle pain, left 07/27/2020    Arthritis     Coronary artery disease involving native heart without angina pectoris 01/11/2022    with previous MI and stent/PCI to the LAD (July 2016)    Deafness     Essential hypertension 04/23/2018    Hammertoe     Heart attack     Heel pain     Hemorrhoids     HLD (hyperlipidemia) 04/23/2018    Ingrown toenail     Kidney disease     Myocardial infarction     Skin disease        ALLERGY  Allergies   Allergen Reactions    Corticosteroids Mental Status Change    Latex Rash        Past Surgical History:   Procedure Laterality Date    CORONARY ANGIOPLASTY WITH STENT PLACEMENT  2016    KNEE MENISCAL REPAIR Left     OTHER SURGICAL HISTORY      Joint surgery        Social History     Socioeconomic History    Marital status:    Tobacco Use    Smoking status: Former     Packs/day: 1.50     Years: 17.00     Additional pack years: 0.00     Total pack years: 25.50      Types: Cigarettes, Cigars     Start date: 1971     Quit date: 1989     Years since quittin.2    Smokeless tobacco: Never   Vaping Use    Vaping Use: Never used   Substance and Sexual Activity    Alcohol use: Yes     Alcohol/week: 5.0 standard drinks of alcohol     Types: 4 Cans of beer, 1 Shots of liquor per week    Drug use: Never    Sexual activity: Yes     Partners: Female     Birth control/protection: None       Family History   Problem Relation Age of Onset    Breast cancer Other     Pancreatic cancer Other     Stroke Other     Heart disease Other     Diabetes type II Other     Pancreatic cancer Mother     Asthma Mother         D:     Heart disease Father         D:         Current Outpatient Medications on File Prior to Visit   Medication Sig    Alpha-Lipoic Acid 600 MG tablet Take  by mouth.    APPLE CIDER VINEGAR PO Take 900 mg by mouth Daily.    ascorbic acid (VITAMIN C) 500 MG tablet Take 1 tablet by mouth Daily.    aspirin 81 MG chewable tablet Chew 1 tablet Daily.    atorvastatin (LIPITOR) 20 MG tablet TAKE ONE TABLET BY MOUTH AT BEDTIME    carvedilol (COREG) 3.125 MG tablet TAKE ONE TABLET BY MOUTH EVERY DAY    Cinnamon 500 MG tablet Take 500 mg by mouth Daily.    clobetasol (TEMOVATE) 0.05 % ointment Apply 1 application  topically to the appropriate area as directed 2 (Two) Times a Day.    coenzyme Q10 100 MG capsule Take 1 capsule by mouth Daily.    Elderberry 500 MG capsule Take 500 mg by mouth Daily.    Flaxseed, Linseed, (FLAXSEED OIL PO) Take 1,300 mg by mouth 2 (Two) Times a Day.    folic acid (FOLVITE) 1 MG tablet Take 1 tablet by mouth Daily.    folic acid-vit B6-vit B12 (FOLTABS) 0.8-10-0.115 MG tablet tablet Take  by mouth Daily.    losartan (COZAAR) 25 MG tablet Take ONE tablet by mouth Daily    magnesium oxide (MAGOX) 400 (241.3 Mg) MG tablet tablet Take 1 tablet by mouth Daily.    methotrexate 2.5 MG tablet Take 4 tablets by mouth 1 (One) Time Per Week.     "metroNIDAZOLE (METROGEL) 0.75 % gel Apply  topically to the appropriate area as directed 2 (Two) Times a Day.    Milk Thistle 250 MG capsule Take 1 tablet by mouth 2 (Two) Times a Day.    nitroglycerin (NITROSTAT) 0.4 MG SL tablet Place 1 tablet under the tongue Every 5 (Five) Minutes As Needed for Chest Pain. Take no more than 3 doses in 15 minutes.    Pyridoxine HCl (Vitamin B6) 100 MG tablet Take 200 mg by mouth Daily.    saccharomyces boulardii (FLORASTOR) 250 MG capsule Take 1 capsule by mouth Daily.    spironolactone (ALDACTONE) 25 MG tablet Take 1 tablet by mouth Every Other Day.    Turmeric (QC Tumeric Complex) 500 MG capsule Take 3 capsules by mouth Daily.    Wheat Dextrin (BENEFIBER DRINK MIX PO) Take  by mouth Daily.    Zinc Gluconate 50 MG capsule Take 50 mg by mouth Daily.    [DISCONTINUED] predniSONE (DELTASONE) 20 MG tablet Take 1 tablet by mouth Daily. (Patient not taking: Reported on 11/20/2023)     No current facility-administered medications on file prior to visit.       Objective   Vitals:    11/20/23 1330   BP: 133/71   Pulse: 51   Weight: 118 kg (259 lb 3.2 oz)   Height: 180.3 cm (70.98\")       Physical Exam  Constitutional:       General: He is awake. He is not in acute distress.     Appearance: Normal appearance.   HENT:      Head: Normocephalic.      Nose: Nose normal. No congestion.   Eyes:      Extraocular Movements: Extraocular movements intact.      Conjunctiva/sclera: Conjunctivae normal.      Pupils: Pupils are equal, round, and reactive to light.   Neck:      Thyroid: No thyromegaly.      Vascular: No JVD.   Cardiovascular:      Rate and Rhythm: Normal rate and regular rhythm.      Chest Wall: PMI is not displaced.      Pulses: Normal pulses.      Heart sounds: Normal heart sounds, S1 normal and S2 normal. No murmur heard.     No friction rub. No gallop. No S3 or S4 sounds.   Pulmonary:      Effort: Pulmonary effort is normal.      Breath sounds: Normal breath sounds. No wheezing, " "rhonchi or rales.   Abdominal:      General: Bowel sounds are normal.      Palpations: Abdomen is soft.      Tenderness: There is no abdominal tenderness.   Musculoskeletal:      Cervical back: No tenderness.      Right lower leg: No edema.      Left lower leg: No edema.   Lymphadenopathy:      Cervical: No cervical adenopathy.   Skin:     General: Skin is warm and dry.      Capillary Refill: Capillary refill takes less than 2 seconds.      Coloration: Skin is not cyanotic.      Findings: No petechiae or rash.      Nails: There is no clubbing.   Neurological:      Mental Status: He is alert.   Psychiatric:         Mood and Affect: Mood normal.         Behavior: Behavior is cooperative.           Result Review     The following data was reviewed by IRIS Sheppard on 11/20/23.    No results found for: \"PROBNP\"           Results for orders placed in visit on 02/08/22    Adult Transthoracic Echo Complete W/ Cont if Necessary Per Protocol    Interpretation Summary  · The left ventricular cavity is dilated.  · Estimated left ventricular EF was in agreement with the calculated left ventricular EF. Left ventricular ejection fraction appears to be 61 - 65%. Left ventricular systolic function is normal.  · Left ventricular diastolic function is consistent with (grade II w/high LAP) pseudonormalization.  · Mild dilation of the aortic root is present.  · Mild mitral and mild aortic valve regurgitation is present           Procedures    Assessment & Plan  Diagnoses and all orders for this visit:    1. Coronary artery disease involving native heart without angina pectoris, unspecified vessel or lesion type (Primary)    2. Hyperlipidemia, unspecified hyperlipidemia type    3. Essential hypertension    4. Nonrheumatic mitral valve regurgitation      1.  His chest pain is atypical and I do not feel that it represents a cardiac source.  He was advised to continue to monitor.  He will notify us if his symptoms worsen or " intensify.  2.  Continue statin therapy.  He is going to have a lipid panel checked with his primary care provider in a couple of weeks.  3.  Blood pressure is well controlled.  Continue current medications.  4.  Mild by most recent echocardiogram.  We will continue to monitor clinically.  5.  Also mild by most recent echocardiogram.  Continue to monitor.  Follow Up   Return in about 1 year (around 11/20/2024) for With .    Patient was given instructions and counseling regarding his condition or for health maintenance advice. Please see specific information pulled into the AVS if appropriate.     Leatha Alvarado, APRLARRY  11/20/23  14:00 EST    Dictated Utilizing Dragon Dictation

## 2023-12-01 ENCOUNTER — TELEPHONE (OUTPATIENT)
Dept: FAMILY MEDICINE CLINIC | Facility: CLINIC | Age: 68
End: 2023-12-01
Payer: MEDICARE

## 2023-12-01 DIAGNOSIS — E78.5 HYPERLIPIDEMIA, UNSPECIFIED HYPERLIPIDEMIA TYPE: Primary | ICD-10-CM

## 2023-12-01 DIAGNOSIS — Z12.5 SCREENING FOR PROSTATE CANCER: ICD-10-CM

## 2023-12-01 DIAGNOSIS — I10 ESSENTIAL HYPERTENSION: ICD-10-CM

## 2023-12-01 NOTE — TELEPHONE ENCOUNTER
Don here with spouse for a visit and requested lab orders prior to visit. Ian verbalized PSA, CBC, CMP, Lipid, UA. Labs pended and sent for cosign

## 2023-12-04 DIAGNOSIS — I10 ESSENTIAL HYPERTENSION: ICD-10-CM

## 2023-12-05 RX ORDER — SPIRONOLACTONE 25 MG/1
25 TABLET ORAL EVERY OTHER DAY
Qty: 45 TABLET | Refills: 3 | Status: SHIPPED | OUTPATIENT
Start: 2023-12-05

## 2023-12-12 ENCOUNTER — OFFICE VISIT (OUTPATIENT)
Dept: FAMILY MEDICINE CLINIC | Facility: CLINIC | Age: 68
End: 2023-12-12
Payer: MEDICARE

## 2023-12-12 VITALS
WEIGHT: 249.6 LBS | OXYGEN SATURATION: 97 % | TEMPERATURE: 97.9 F | HEART RATE: 66 BPM | DIASTOLIC BLOOD PRESSURE: 80 MMHG | HEIGHT: 71 IN | SYSTOLIC BLOOD PRESSURE: 136 MMHG | BODY MASS INDEX: 34.94 KG/M2

## 2023-12-12 DIAGNOSIS — E78.5 HYPERLIPIDEMIA, UNSPECIFIED HYPERLIPIDEMIA TYPE: ICD-10-CM

## 2023-12-12 DIAGNOSIS — N64.4 BREAST PAIN: ICD-10-CM

## 2023-12-12 DIAGNOSIS — I10 ESSENTIAL HYPERTENSION: Primary | ICD-10-CM

## 2023-12-12 DIAGNOSIS — N63.0 BREAST NODULE: ICD-10-CM

## 2023-12-12 PROCEDURE — 99213 OFFICE O/P EST LOW 20 MIN: CPT | Performed by: NURSE PRACTITIONER

## 2023-12-12 PROCEDURE — 1160F RVW MEDS BY RX/DR IN RCRD: CPT | Performed by: NURSE PRACTITIONER

## 2023-12-12 PROCEDURE — 3075F SYST BP GE 130 - 139MM HG: CPT | Performed by: NURSE PRACTITIONER

## 2023-12-12 PROCEDURE — 3079F DIAST BP 80-89 MM HG: CPT | Performed by: NURSE PRACTITIONER

## 2023-12-12 PROCEDURE — 1159F MED LIST DOCD IN RCRD: CPT | Performed by: NURSE PRACTITIONER

## 2023-12-12 NOTE — PROGRESS NOTES
"Chief Complaint  6 month follow up for HTN, HLD    Subjective         Yoan Franco Jr. presents to [facility]  Patient presents to the office today for a 6-month follow-up.  Patient blood pressure is 136/80.  He denies any chest pain shortness breath palpitation at this time.  I did review recent lab work with the patient patient's potassium level was slightly elevated.  He does state that he takes an over-the-counter potassium supplement.  I did tell him this discontinue this to bring his potassium levels back within range.  He verbalized understanding.  He does state that he has noticed a nodule on his right breast that is very tender to touch.  Did discuss that we were other evaluate this with imaging.  Verbalized understanding.  He denies any other concerns at this time.         Objective     Vitals:    12/12/23 0652   BP: 136/80   BP Location: Right arm   Patient Position: Sitting   Cuff Size: Adult   Pulse: 66   Temp: 97.9 °F (36.6 °C)   TempSrc: Temporal   SpO2: 97%   Weight: 113 kg (249 lb 9.6 oz)   Height: 180.3 cm (71\")      Body mass index is 34.81 kg/m².             Physical Exam  Vitals reviewed.   Constitutional:       Appearance: Normal appearance.   Cardiovascular:      Rate and Rhythm: Normal rate and regular rhythm.      Pulses: Normal pulses.      Heart sounds: Normal heart sounds, S1 normal and S2 normal. No murmur heard.  Pulmonary:      Effort: Pulmonary effort is normal. No respiratory distress.      Breath sounds: Normal breath sounds.   Chest:   Breasts:     Right: Mass and tenderness present.          Comments: Nodule noted to the right breast, tender with palpation   Lymphadenopathy:      Upper Body:      Right upper body: No axillary adenopathy.   Skin:     General: Skin is warm and dry.   Neurological:      Mental Status: He is alert and oriented to person, place, and time.   Psychiatric:         Attention and Perception: Attention normal.         " Mood and Affect: Mood normal.         Behavior: Behavior normal.          Result Review :   The following data was reviewed by: IRIS Spivey on 12/12/2023:      Procedures    Assessment and Plan   Diagnoses and all orders for this visit:    1. Essential hypertension (Primary)  -     CBC (No Diff); Future  -     Comprehensive Metabolic Panel; Future  -     Lipid Panel; Future    2. Hyperlipidemia, unspecified hyperlipidemia type  -     CBC (No Diff); Future  -     Comprehensive Metabolic Panel; Future  -     Lipid Panel; Future    3. Breast nodule  -     Mammo Diagnostic Digital Tomosynthesis Right With CAD; Future  -     US Breast Right Limited; Future    4. Breast pain  -     Mammo Diagnostic Digital Tomosynthesis Right With CAD; Future  -     US Breast Right Limited; Future        Follow Up   Return in about 6 months (around 6/12/2024) for Recheck.  Patient was given instructions and counseling regarding his condition or for health maintenance advice. Please see specific information pulled into the AVS if appropriate.

## 2023-12-28 RX ORDER — LOSARTAN POTASSIUM 25 MG/1
TABLET ORAL
Qty: 90 TABLET | Refills: 3 | Status: SHIPPED | OUTPATIENT
Start: 2023-12-28

## 2024-01-31 ENCOUNTER — HOSPITAL ENCOUNTER (OUTPATIENT)
Dept: MAMMOGRAPHY | Facility: HOSPITAL | Age: 69
Discharge: HOME | End: 2024-01-31
Payer: MEDICARE

## 2024-01-31 ENCOUNTER — HOSPITAL ENCOUNTER (OUTPATIENT)
Dept: ULTRASOUND IMAGING | Facility: HOSPITAL | Age: 69
Discharge: HOME | End: 2024-01-31
Payer: MEDICARE

## 2024-01-31 DIAGNOSIS — N63.0 BREAST NODULE: ICD-10-CM

## 2024-01-31 DIAGNOSIS — N64.4 BREAST PAIN: ICD-10-CM

## 2024-01-31 PROCEDURE — G0279 TOMOSYNTHESIS, MAMMO: HCPCS

## 2024-01-31 PROCEDURE — 77066 DX MAMMO INCL CAD BI: CPT

## 2024-02-26 RX ORDER — CARVEDILOL 3.12 MG/1
TABLET ORAL
Qty: 30 TABLET | Refills: 11 | Status: SHIPPED | OUTPATIENT
Start: 2024-02-26

## 2024-03-12 DIAGNOSIS — E78.5 HYPERLIPIDEMIA, UNSPECIFIED HYPERLIPIDEMIA TYPE: ICD-10-CM

## 2024-03-13 RX ORDER — ATORVASTATIN CALCIUM 20 MG/1
TABLET, FILM COATED ORAL
Qty: 90 TABLET | Refills: 3 | Status: SHIPPED | OUTPATIENT
Start: 2024-03-13

## 2024-06-05 ENCOUNTER — OFFICE VISIT (OUTPATIENT)
Dept: FAMILY MEDICINE CLINIC | Facility: CLINIC | Age: 69
End: 2024-06-05
Payer: MEDICARE

## 2024-06-05 VITALS
SYSTOLIC BLOOD PRESSURE: 136 MMHG | DIASTOLIC BLOOD PRESSURE: 80 MMHG | TEMPERATURE: 96.1 F | OXYGEN SATURATION: 97 % | HEIGHT: 71 IN | HEART RATE: 84 BPM | WEIGHT: 252.2 LBS | BODY MASS INDEX: 35.31 KG/M2

## 2024-06-05 DIAGNOSIS — Z00.00 MEDICARE ANNUAL WELLNESS VISIT, SUBSEQUENT: Primary | ICD-10-CM

## 2024-06-05 DIAGNOSIS — L40.9 PSORIASIS: ICD-10-CM

## 2024-06-05 DIAGNOSIS — E78.5 HYPERLIPIDEMIA, UNSPECIFIED HYPERLIPIDEMIA TYPE: ICD-10-CM

## 2024-06-05 DIAGNOSIS — I10 PRIMARY HYPERTENSION: ICD-10-CM

## 2024-06-05 PROCEDURE — 1170F FXNL STATUS ASSESSED: CPT | Performed by: NURSE PRACTITIONER

## 2024-06-05 PROCEDURE — 1159F MED LIST DOCD IN RCRD: CPT | Performed by: NURSE PRACTITIONER

## 2024-06-05 PROCEDURE — 1125F AMNT PAIN NOTED PAIN PRSNT: CPT | Performed by: NURSE PRACTITIONER

## 2024-06-05 PROCEDURE — 99213 OFFICE O/P EST LOW 20 MIN: CPT | Performed by: NURSE PRACTITIONER

## 2024-06-05 PROCEDURE — G0439 PPPS, SUBSEQ VISIT: HCPCS | Performed by: NURSE PRACTITIONER

## 2024-06-05 PROCEDURE — 1160F RVW MEDS BY RX/DR IN RCRD: CPT | Performed by: NURSE PRACTITIONER

## 2024-06-05 PROCEDURE — 3075F SYST BP GE 130 - 139MM HG: CPT | Performed by: NURSE PRACTITIONER

## 2024-06-05 PROCEDURE — 3079F DIAST BP 80-89 MM HG: CPT | Performed by: NURSE PRACTITIONER

## 2024-06-05 NOTE — PROGRESS NOTES
The ABCs of the Annual Wellness Visit  Subsequent Medicare Wellness Visit    Subjective    Yoan Franco Jr. is a 68 y.o. male who presents for a Subsequent Medicare Wellness Visit.    The following portions of the patient's history were reviewed and   updated as appropriate: allergies, current medications, past family history, past medical history, past social history, past surgical history, and problem list.    Compared to one year ago, the patient feels his physical   health is better.    Compared to one year ago, the patient feels his mental   health is the same.    Recent Hospitalizations:  He was not admitted to the hospital during the last year.       Current Medical Providers:  Patient Care Team:  Ian Mane APRN as PCP - General (Nurse Practitioner)    Outpatient Medications Prior to Visit   Medication Sig Dispense Refill    Alpha-Lipoic Acid 600 MG tablet Take  by mouth.      APPLE CIDER VINEGAR PO Take 900 mg by mouth Daily.      ascorbic acid (VITAMIN C) 500 MG tablet Take 1 tablet by mouth Daily.      aspirin 81 MG chewable tablet Chew 1 tablet Daily.      atorvastatin (LIPITOR) 20 MG tablet TAKE ONE TABLET BY MOUTH AT BEDTIME 90 tablet 3    carvedilol (COREG) 3.125 MG tablet TAKE ONE TABLET BY MOUTH EVERY DAY 30 tablet 11    Cinnamon 500 MG tablet Take 500 mg by mouth Daily.      clobetasol (TEMOVATE) 0.05 % ointment Apply 1 Application topically to the appropriate area as directed 2 (Two) Times a Day.      coenzyme Q10 100 MG capsule Take 1 capsule by mouth Daily.      Elderberry 500 MG capsule Take 500 mg by mouth Daily.      Flaxseed, Linseed, (FLAXSEED OIL PO) Take 1,300 mg by mouth 2 (Two) Times a Day.      folic acid (FOLVITE) 1 MG tablet Take 1 tablet by mouth Daily.      folic acid-vit B6-vit B12 (FOLTABS) 0.8-10-0.115 MG tablet tablet Take  by mouth Daily.      losartan (COZAAR) 25 MG tablet Take ONE tablet by mouth Daily 90 tablet 3    magnesium oxide (MAGOX) 400 (241.3 Mg) MG tablet  tablet Take 1 tablet by mouth Daily.      methotrexate 2.5 MG tablet Take 4 tablets by mouth 1 (One) Time Per Week.      metroNIDAZOLE (METROGEL) 0.75 % gel Apply  topically to the appropriate area as directed 2 (Two) Times a Day.      Milk Thistle 250 MG capsule Take 1 tablet by mouth 2 (Two) Times a Day.      nitroglycerin (NITROSTAT) 0.4 MG SL tablet Place 1 tablet under the tongue Every 5 (Five) Minutes As Needed for Chest Pain. Take no more than 3 doses in 15 minutes. 25 tablet 1    Pyridoxine HCl (Vitamin B6) 100 MG tablet Take 200 mg by mouth Daily.      saccharomyces boulardii (FLORASTOR) 250 MG capsule Take 1 capsule by mouth Daily.      spironolactone (ALDACTONE) 25 MG tablet TAKE ONE TABLET BY MOUTH EVERY OTHER DAY 45 tablet 3    Turmeric (QC Tumeric Complex) 500 MG capsule Take 3 capsules by mouth Daily.      Wheat Dextrin (BENEFIBER DRINK MIX PO) Take  by mouth Daily.      Zinc Gluconate 50 MG capsule Take 50 mg by mouth Daily.       No facility-administered medications prior to visit.       No opioid medication identified on active medication list. I have reviewed chart for other potential  high risk medication/s and harmful drug interactions in the elderly.        Aspirin is on active medication list. Aspirin use is indicated based on review of current medical condition/s. Pros and cons of this therapy have been discussed today. Benefits of this medication outweigh potential harm.  Patient has been encouraged to continue taking this medication.  .      Patient Active Problem List   Diagnosis    Essential hypertension    Hyperlipidemia    Coronary artery disease involving native heart without angina pectoris    Morbid (severe) obesity due to excess calories    Nonrheumatic mitral valve regurgitation     Advance Care Planning   Advance Care Planning     Advance Directive is on file.  ACP discussion was held with the patient during this visit. Patient has an advance directive in EMR which is still valid.  "     Objective    Vitals:    24 0657   BP: 136/80   BP Location: Right arm   Patient Position: Sitting   Cuff Size: Adult   Pulse: 84   Temp: 96.1 °F (35.6 °C)   TempSrc: Temporal   SpO2: 97%   Weight: 114 kg (252 lb 3.2 oz)   Height: 180.3 cm (71\")   PainSc:   2     Estimated body mass index is 35.17 kg/m² as calculated from the following:    Height as of this encounter: 180.3 cm (71\").    Weight as of this encounter: 114 kg (252 lb 3.2 oz).           Does the patient have evidence of cognitive impairment? No          HEALTH RISK ASSESSMENT    Smoking Status:  Social History     Tobacco Use   Smoking Status Former    Current packs/day: 0.00    Average packs/day: 1.5 packs/day for 18.7 years (28.0 ttl pk-yrs)    Types: Cigarettes, Cigars    Start date: 1971    Quit date: 1989    Years since quittin.7   Smokeless Tobacco Never     Alcohol Consumption:  Social History     Substance and Sexual Activity   Alcohol Use Yes    Alcohol/week: 5.0 standard drinks of alcohol    Types: 4 Cans of beer, 1 Shots of liquor per week     Fall Risk Screen:    ZAC Fall Risk Assessment was completed, and patient is at LOW risk for falls.Assessment completed on:2024    Depression Screenin/5/2024     6:58 AM   PHQ-2/PHQ-9 Depression Screening   Little Interest or Pleasure in Doing Things 0-->not at all   Feeling Down, Depressed or Hopeless 0-->not at all   PHQ-9: Brief Depression Severity Measure Score 0       Health Habits and Functional and Cognitive Screenin/5/2024     6:58 AM   Functional & Cognitive Status   Do you have difficulty preparing food and eating? No   Do you have difficulty bathing yourself, getting dressed or grooming yourself? No   Do you have difficulty using the toilet? No   Do you have difficulty moving around from place to place? No   Do you have trouble with steps or getting out of a bed or a chair? No   Current Diet Well Balanced Diet   Dental Exam Up to date   Eye Exam " Up to date   Exercise (times per week) 4 times per week   Current Exercises Include Walking;Yard Work;Other   Do you need help using the phone?  No   Are you deaf or do you have serious difficulty hearing?  No   Do you need help to go to places out of walking distance? No   Do you need help shopping? No   Do you need help preparing meals?  No   Do you need help with housework?  No   Do you need help with laundry? No   Do you need help taking your medications? No   Do you need help managing money? No   Do you ever drive or ride in a car without wearing a seat belt? No   Have you felt unusual stress, anger or loneliness in the last month? No   Who do you live with? Spouse   If you need help, do you have trouble finding someone available to you? No   Have you been bothered in the last four weeks by sexual problems? No   Do you have difficulty concentrating, remembering or making decisions? No       Age-appropriate Screening Schedule:  Refer to the list below for future screening recommendations based on patient's age, sex and/or medical conditions. Orders for these recommended tests are listed in the plan section. The patient has been provided with a written plan.    Health Maintenance   Topic Date Due    AAA SCREEN (ONE-TIME)  Never done    ZOSTER VACCINE (1 of 2) 06/05/2024 (Originally 9/4/2004)    COVID-19 Vaccine (1 - 2023-24 season) 08/25/2024 (Originally 9/1/2023)    TDAP/TD VACCINES (1 - Tdap) 12/12/2024 (Originally 9/4/1973)    RSV Vaccine - Adults (1 - 1-dose 60+ series) 06/05/2025 (Originally 9/4/2014)    Pneumococcal Vaccine 65+ (3 of 3 - PPSV23 or PCV20) 06/05/2025 (Originally 2/7/2024)    BMI FOLLOWUP  06/23/2024    INFLUENZA VACCINE  08/01/2024    COLORECTAL CANCER SCREENING  12/07/2024    LIPID PANEL  05/31/2025    ANNUAL WELLNESS VISIT  06/05/2025    HEPATITIS C SCREENING  Completed                  CMS Preventative Services Quick Reference  Risk Factors Identified During Encounter  None Identified  The  "above risks/problems have been discussed with the patient.  Pertinent information has been shared with the patient in the After Visit Summary.  An After Visit Summary and PPPS were made available to the patient.    Follow Up:   Next Medicare Wellness visit to be scheduled in 1 year.       Additional E&M Note during same encounter follows:  Patient has multiple medical problems which are significant and separately identifiable that require additional work above and beyond the Medicare Wellness Visit.      Chief Complaint  Medicare Wellness-subsequent and Hypertension (6 month follow up)    Subjective        Hypertension      Yoan Franco Jr. is also being seen today for annual wellness exam and HTN followup. He has no new health concerns but has questions about switching to Otezla as his dermatologist recommended this medication recently. He had looked into this medication in the past but his copay was 2k a month. He has new insurance now. He is currently on Methotrexate. He has been monitoring his blood pressure at home most every week and it has been running 120's systolic 70's diastolic. He has no complaints of dizziness, headaches, chest pain.          Objective   Vital Signs:  /80 (BP Location: Right arm, Patient Position: Sitting, Cuff Size: Adult)   Pulse 84   Temp 96.1 °F (35.6 °C) (Temporal)   Ht 180.3 cm (71\")   Wt 114 kg (252 lb 3.2 oz)   SpO2 97%   BMI 35.17 kg/m²     Physical Exam  Vitals reviewed.   Constitutional:       Appearance: Normal appearance.   HENT:      Head: Normocephalic and atraumatic.      Right Ear: Tympanic membrane, ear canal and external ear normal.      Left Ear: Tympanic membrane, ear canal and external ear normal.      Nose: Nose normal.      Mouth/Throat:      Mouth: Mucous membranes are moist.      Pharynx: Oropharynx is clear.   Eyes:      Extraocular Movements: Extraocular movements intact.      Conjunctiva/sclera: Conjunctivae normal.      Pupils: Pupils are " equal, round, and reactive to light.   Cardiovascular:      Rate and Rhythm: Normal rate and regular rhythm.      Pulses: Normal pulses.      Heart sounds: Normal heart sounds, S1 normal and S2 normal. No murmur heard.  Pulmonary:      Effort: Pulmonary effort is normal. No respiratory distress.      Breath sounds: Normal breath sounds.   Abdominal:      General: Abdomen is flat. Bowel sounds are normal.      Palpations: Abdomen is soft.   Musculoskeletal:         General: Normal range of motion.      Cervical back: Normal range of motion and neck supple.   Skin:     General: Skin is warm and dry.   Neurological:      General: No focal deficit present.      Mental Status: He is alert and oriented to person, place, and time.   Psychiatric:         Attention and Perception: Attention normal.         Mood and Affect: Mood normal.         Behavior: Behavior normal.                         Assessment and Plan   Diagnoses and all orders for this visit:    1. Medicare annual wellness visit, subsequent (Primary)    2. Psoriasis    3. Primary hypertension  -     CBC (No Diff); Future  -     Comprehensive Metabolic Panel; Future    4. Hyperlipidemia, unspecified hyperlipidemia type  -     Lipid Panel; Future      Did discuss Otezla with the patient she is considering this as a treatment option.  We did discuss the risk and benefits as well as the possible side effects.  Patient states that he will have further discussions with rheumatology regarding this medication.       Follow Up   Return in about 6 months (around 12/5/2024).  Patient was given instructions and counseling regarding his condition or for health maintenance advice. Please see specific information pulled into the AVS if appropriate.           Answers submitted by the patient for this visit:  Primary Reason for Visit (Submitted on 6/4/2024)  What is the primary reason for your visit?: Other  Other (Submitted on 6/4/2024)  Please describe your symptoms.: 6 month  check-up  Have you had these symptoms before?: No  How long have you been having these symptoms?: 1-4 days  Please list any medications you are currently taking for this condition.: n/a  Please describe any probable cause for these symptoms. : n/a

## 2024-11-08 ENCOUNTER — TELEPHONE (OUTPATIENT)
Dept: CARDIOLOGY | Facility: CLINIC | Age: 69
End: 2024-11-08
Payer: MEDICARE

## 2024-11-08 NOTE — TELEPHONE ENCOUNTER
"Delete after reviewing: Send to the appropriate pool. Check your call action grid or workflows.    Caller: Yoan Franco Jr. \"Don\"     Relationship: [unfilled]     Best call back number: [phone]     What is your medical concern? PT JUST WANTS TO KNOW IF HE NEEDS ANY LAB WORK DONE DID NOT SEE ANY ACTIVE ORDERS BUT SENDING TE JUST IN CASE IT WOULD BE FOR HIS YEARLY FOLLOW UP ON 11.21.2024 IF HE DOES THEN HE WANTS TO KNOW IF WE CAN SEND THE ORDER OF TO [facility] SO HE CAN GET DONE THERE DUE TO HIS INSURANCE POLICY BEING BETTER THERE         "

## 2024-11-11 ENCOUNTER — TELEPHONE (OUTPATIENT)
Dept: CARDIOLOGY | Facility: CLINIC | Age: 69
End: 2024-11-11
Payer: MEDICARE

## 2024-11-11 NOTE — TELEPHONE ENCOUNTER
Annual labs should be done through PCP office. We will order labs at the time of our office visit if we deem it necessary at that time. Thanks

## 2024-11-11 NOTE — TELEPHONE ENCOUNTER
The [facility] received a fax that requires your attention. The document has been indexed to the patient’s chart for your review.      Reason for sending: EXTERNAL MEDICAL RECORD NOTIFICATION     Documents Description: KELLEE NONADHERENCE [id number]    Name of Sender: AMARJIT     Date Indexed: 11.11.24

## 2024-11-13 ENCOUNTER — OFFICE VISIT (OUTPATIENT)
Dept: FAMILY MEDICINE CLINIC | Facility: CLINIC | Age: 69
End: 2024-11-13
Payer: MEDICARE

## 2024-11-13 VITALS
HEART RATE: 67 BPM | RESPIRATION RATE: 16 BRPM | TEMPERATURE: 97.3 F | HEIGHT: 71 IN | OXYGEN SATURATION: 94 % | BODY MASS INDEX: 34.86 KG/M2 | SYSTOLIC BLOOD PRESSURE: 138 MMHG | DIASTOLIC BLOOD PRESSURE: 62 MMHG | WEIGHT: 249 LBS

## 2024-11-13 DIAGNOSIS — J02.9 SORE THROAT: ICD-10-CM

## 2024-11-13 DIAGNOSIS — U07.1 COVID-19 VIRUS DETECTED: Primary | ICD-10-CM

## 2024-11-13 LAB
EXPIRATION DATE: ABNORMAL
EXPIRATION DATE: NORMAL
FLUAV AG UPPER RESP QL IA.RAPID: NOT DETECTED
FLUBV AG UPPER RESP QL IA.RAPID: NOT DETECTED
INTERNAL CONTROL: ABNORMAL
INTERNAL CONTROL: NORMAL
Lab: ABNORMAL
Lab: NORMAL
S PYO AG THROAT QL: NEGATIVE
SARS-COV-2 AG UPPER RESP QL IA.RAPID: DETECTED

## 2024-11-13 PROCEDURE — 3078F DIAST BP <80 MM HG: CPT | Performed by: STUDENT IN AN ORGANIZED HEALTH CARE EDUCATION/TRAINING PROGRAM

## 2024-11-13 PROCEDURE — 87428 SARSCOV & INF VIR A&B AG IA: CPT | Performed by: STUDENT IN AN ORGANIZED HEALTH CARE EDUCATION/TRAINING PROGRAM

## 2024-11-13 PROCEDURE — 87880 STREP A ASSAY W/OPTIC: CPT | Performed by: STUDENT IN AN ORGANIZED HEALTH CARE EDUCATION/TRAINING PROGRAM

## 2024-11-13 PROCEDURE — 3075F SYST BP GE 130 - 139MM HG: CPT | Performed by: STUDENT IN AN ORGANIZED HEALTH CARE EDUCATION/TRAINING PROGRAM

## 2024-11-13 PROCEDURE — 99213 OFFICE O/P EST LOW 20 MIN: CPT | Performed by: STUDENT IN AN ORGANIZED HEALTH CARE EDUCATION/TRAINING PROGRAM

## 2024-11-13 PROCEDURE — 1126F AMNT PAIN NOTED NONE PRSNT: CPT | Performed by: STUDENT IN AN ORGANIZED HEALTH CARE EDUCATION/TRAINING PROGRAM

## 2024-11-13 RX ORDER — AZITHROMYCIN 250 MG/1
TABLET, FILM COATED ORAL
Qty: 6 TABLET | Refills: 0 | Status: SHIPPED | OUTPATIENT
Start: 2024-11-13

## 2024-11-13 NOTE — PROGRESS NOTES
"Chief Complaint  Sore Throat    Subjective         Yoan Franco Jr. is a 69 y.o. male who presents to [facility]    69 years old comes to the clinic today for an acute visit.    6 days active symptoms of cough/congestion/body aches and low-grade temperature.  Initially wife started with this symptoms, patient reports already some improvement today with sore throat and hoarseness.  No fever in last 24 hours.  Does not report any GI symptoms or any abdominal pain.    12+ review of systems are unremarkable otherwise    Objective   Vital Signs:   Vitals:    11/13/24 1609   BP: 138/62   BP Location: Left arm   Patient Position: Sitting   Cuff Size: Adult   Pulse: 67   Resp: 16   Temp: 97.3 °F (36.3 °C)   TempSrc: Temporal   SpO2: 94%   Weight: 113 kg (249 lb)   Height: 180.3 cm (71\")   PainSc: 0-No pain      Body mass index is 34.73 kg/m².   Wt Readings from Last 3 Encounters:   11/13/24 113 kg (249 lb)   06/05/24 114 kg (252 lb 3.2 oz)   12/12/23 113 kg (249 lb 9.6 oz)      BP Readings from Last 3 Encounters:   11/13/24 138/62   06/05/24 136/80   12/12/23 136/80        Patient Care Team:  Ian Mane APRN as PCP - General (Nurse Practitioner)     Physical Exam  Pulmonary:      Effort: Pulmonary effort is normal.      Breath sounds: Normal breath sounds.      Comments: Normal breath sounds, no wheezing.  Limited physical exam due to COVID-19           BMI is >= 30 and <35. (Class 1 Obesity). The following options were offered after discussion;: weight loss educational material (shared in after visit summary)              Assessment and Plan   Diagnoses and all orders for this visit:    1. COVID-19 virus detected (Primary)    2. Sore throat  -     POCT SARS-CoV-2 Antigen SCOTT + Flu  -     POCT rapid strep A  -     azithromycin (Zithromax Z-Paulie) 250 MG tablet; Take 2 tablets by mouth on day 1, then 1 tablet daily on days 2-5  Dispense: 6 tablet; Refill: 0      Paxlovid discussed, " symptoms started more than 5 days ago.    We will go ahead and send empiric azithromycin for secondary prevention.    Patient to continue with supportive management as discussed,    Patient to call if not improved or any worsening.        Tobacco Use: Medium Risk (11/13/2024)    Patient History     Smoking Tobacco Use: Former     Smokeless Tobacco Use: Never     Passive Exposure: Not on file            Follow Up   No follow-ups on file.  Patient was given instructions and counseling regarding his condition or for health maintenance advice. Please see specific information pulled into the AVS if appropriate.

## 2024-11-14 ENCOUNTER — TELEPHONE (OUTPATIENT)
Dept: FAMILY MEDICINE CLINIC | Facility: CLINIC | Age: 69
End: 2024-11-14
Payer: MEDICARE

## 2024-11-14 NOTE — TELEPHONE ENCOUNTER
"  Caller: Yoan Franco Jr. \"Don\"    Relationship: Self    Best call back number:[phone]     What was the call regarding: PATIENT STATES THAT HE SAW DR FRANCIS YESTERDAY AND FORGOT TO ASK HIM A FEW QUESTIONS.     PATIENT STATES THAT HE NEEDS TO KNOW HOW LONG HE NEEDS TO QUARANTINE.     PATIENT ALSO STATES THAT HE HAD A DENTAL PROCEDURE AND A CARDIOLOGIST APPOINTMENT SCHEDULED FOR 11.21.24 AND NEEDS TO KNOW IF HE SHOULD RESCHEDULE THESE APPOINTMENTS.     PATIENT ALSO STATES THAT THERE ARE MEDICATIONS HE HAS TO TAKE LEADING UP TO HIS DENTAL PROCEDURE SO HE NEEDS TO KNOW IF THAT WILL BE AFFECTED.     PATIENT WOULD LIKE A CALL BACK.       "

## 2024-11-15 NOTE — TELEPHONE ENCOUNTER
Called and spoke with Omari, advised that the quarantine  period was 5 days from the start of they symptoms. Since his symptoms started on Wednesday, he needs to quarantine until Sunday. Omari verbalized understanding and asked if he should postpone his dental procedure. I advised he reach out and speak to his dentist to see about their recommendations if he needs to postpone his procedure. Omari verbalized he would reach out to them to discuss.

## 2024-11-21 ENCOUNTER — OFFICE VISIT (OUTPATIENT)
Dept: CARDIOLOGY | Facility: CLINIC | Age: 69
End: 2024-11-21
Payer: MEDICARE

## 2024-11-21 VITALS
HEART RATE: 54 BPM | SYSTOLIC BLOOD PRESSURE: 129 MMHG | HEIGHT: 71 IN | BODY MASS INDEX: 34.72 KG/M2 | DIASTOLIC BLOOD PRESSURE: 69 MMHG | WEIGHT: 248 LBS

## 2024-11-21 DIAGNOSIS — E78.5 HYPERLIPIDEMIA, UNSPECIFIED HYPERLIPIDEMIA TYPE: ICD-10-CM

## 2024-11-21 DIAGNOSIS — I10 ESSENTIAL HYPERTENSION: ICD-10-CM

## 2024-11-21 DIAGNOSIS — I25.10 CORONARY ARTERY DISEASE INVOLVING NATIVE HEART WITHOUT ANGINA PECTORIS, UNSPECIFIED VESSEL OR LESION TYPE: Primary | ICD-10-CM

## 2024-11-21 NOTE — ASSESSMENT & PLAN NOTE
Lipid abnormalities are controlled. Target LDL for this patient is <70. Explained to him the respective contributions of genetics, diet, and exercise to lipid levels and encouraged healthy diet and routine aerobic exercise. Continue current medications.

## 2024-11-21 NOTE — ASSESSMENT & PLAN NOTE
Taking medications as instructed without side effects.  No angina or anginal-like symptoms. Continue with current management.

## 2024-11-21 NOTE — PROGRESS NOTES
Chief Complaint  Follow-up and Coronary Artery Disease    Subjective        History of Present Illness  Yoan Franco . presents to [facility] for follow up.   Patient is a 69-year-old male with past medical history outlined below, significant for coronary artery disease status post MI in  with PCI to the LAD, hypertension, hyperlipidemia who presents for follow-up.  Doing well from a cardiac standpoint.  He has no complaints or concerns today.  He denies chest pain or discomfort, dyspnea, palpitations, edema or syncope    Past Medical History:   Diagnosis Date    Allergic     Allergies     Ankle pain, left 2020    Arthritis     Coronary artery disease involving native heart without angina pectoris 2022    with previous MI and stent/PCI to the LAD (2016)    Deafness     Essential hypertension 2018    Hammertoe     Heart attack     Heel pain     Hemorrhoids     HLD (hyperlipidemia) 2018    Ingrown toenail     Kidney disease     Myocardial infarction     Obesity     Skin disease        ALLERGY  Allergies   Allergen Reactions    Corticosteroids Mental Status Change    Latex Rash        Past Surgical History:   Procedure Laterality Date    CARDIAC CATHETERIZATION      CARDIAC SURGERY      CORONARY ANGIOPLASTY WITH STENT PLACEMENT      CORONARY STENT PLACEMENT      JOINT REPLACEMENT      KNEE MENISCAL REPAIR Left     OTHER SURGICAL HISTORY      Joint surgery        Social History     Socioeconomic History    Marital status:    Tobacco Use    Smoking status: Former     Current packs/day: 0.00     Average packs/day: 1.5 packs/day for 18.7 years (28.0 ttl pk-yrs)     Types: Cigarettes, Cigars     Start date: 1971     Quit date: 1989     Years since quittin.2    Smokeless tobacco: Never   Vaping Use    Vaping status: Never Used   Substance and Sexual Activity    Alcohol use: Yes     Alcohol/week: 5.0 standard drinks of alcohol      Types: 4 Cans of beer, 1 Shots of liquor per week    Drug use: Never    Sexual activity: Yes     Partners: Female     Birth control/protection: None       Family History   Problem Relation Age of Onset    Breast cancer Other     Pancreatic cancer Other     Stroke Other     Heart disease Other     Diabetes type II Other     Pancreatic cancer Mother     Asthma Mother         D: 1997    Cancer Mother         D: 1997    Heart disease Father         D: 1993        Current Outpatient Medications on File Prior to Visit   Medication Sig    Alpha-Lipoic Acid 600 MG tablet Take  by mouth.    APPLE CIDER VINEGAR PO Take 900 mg by mouth Daily.    ascorbic acid (VITAMIN C) 500 MG tablet Take 1 tablet by mouth Daily.    aspirin 81 MG chewable tablet Chew 1 tablet Daily.    atorvastatin (LIPITOR) 20 MG tablet TAKE ONE TABLET BY MOUTH AT BEDTIME    carvedilol (COREG) 3.125 MG tablet TAKE ONE TABLET BY MOUTH EVERY DAY    Cinnamon 500 MG tablet Take 500 mg by mouth Daily.    clobetasol (TEMOVATE) 0.05 % ointment Apply 1 Application topically to the appropriate area as directed 2 (Two) Times a Day.    Elderberry 500 MG capsule Take 500 mg by mouth Daily.    Flaxseed, Linseed, (FLAXSEED OIL PO) Take 1,300 mg by mouth 2 (Two) Times a Day.    folic acid (FOLVITE) 1 MG tablet Take 1 tablet by mouth Daily.    folic acid-vit B6-vit B12 (FOLTABS) 0.8-10-0.115 MG tablet tablet Take  by mouth Daily.    losartan (COZAAR) 25 MG tablet Take ONE tablet by mouth Daily    magnesium oxide (MAGOX) 400 (241.3 Mg) MG tablet tablet Take 1 tablet by mouth Daily.    metroNIDAZOLE (METROGEL) 0.75 % gel Apply  topically to the appropriate area as directed 2 (Two) Times a Day.    Milk Thistle 250 MG capsule Take 1 tablet by mouth 2 (Two) Times a Day.    nitroglycerin (NITROSTAT) 0.4 MG SL tablet Place 1 tablet under the tongue Every 5 (Five) Minutes As Needed for Chest Pain. Take no more than 3 doses in 15 minutes.    Pyridoxine HCl (Vitamin B6) 100 MG tablet  "Take 200 mg by mouth Daily.    saccharomyces boulardii (FLORASTOR) 250 MG capsule Take 1 capsule by mouth Daily.    spironolactone (ALDACTONE) 25 MG tablet TAKE ONE TABLET BY MOUTH EVERY OTHER DAY    Turmeric (QC Tumeric Complex) 500 MG capsule Take 3 capsules by mouth Daily.    Wheat Dextrin (BENEFIBER DRINK MIX PO) Take  by mouth Daily.    Zinc Gluconate 50 MG capsule Take 50 mg by mouth Daily.    azithromycin (Zithromax Z-Paulie) 250 MG tablet Take 2 tablets by mouth on day 1, then 1 tablet daily on days 2-5 (Patient not taking: Reported on 11/21/2024)    coenzyme Q10 100 MG capsule Take 1 capsule by mouth Daily.    methotrexate 2.5 MG tablet Take 4 tablets by mouth 1 (One) Time Per Week. (Patient not taking: Reported on 11/21/2024)     No current facility-administered medications on file prior to visit.       Objective   Vitals:    11/21/24 1336   BP: 129/69   Pulse: 54   Weight: 112 kg (248 lb)   Height: 180.3 cm (71\")       Physical Exam  Constitutional:       General: He is awake. He is not in acute distress.     Appearance: Normal appearance.   HENT:      Head: Normocephalic.      Nose: Nose normal. No congestion.   Eyes:      Extraocular Movements: Extraocular movements intact.      Conjunctiva/sclera: Conjunctivae normal.      Pupils: Pupils are equal, round, and reactive to light.   Neck:      Thyroid: No thyromegaly.      Vascular: No JVD.   Cardiovascular:      Rate and Rhythm: Normal rate and regular rhythm.      Chest Wall: PMI is not displaced.      Pulses: Normal pulses.      Heart sounds: Normal heart sounds, S1 normal and S2 normal. No murmur heard.     No friction rub. No gallop. No S3 or S4 sounds.   Pulmonary:      Effort: Pulmonary effort is normal.      Breath sounds: Normal breath sounds. No wheezing, rhonchi or rales.   Abdominal:      General: Bowel sounds are normal.      Palpations: Abdomen is soft.      Tenderness: There is no abdominal tenderness.   Musculoskeletal:      Cervical back: No " tenderness.      Right lower leg: No edema.      Left lower leg: No edema.   Lymphadenopathy:      Cervical: No cervical adenopathy.   Skin:     General: Skin is warm and dry.      Capillary Refill: Capillary refill takes less than 2 seconds.      Coloration: Skin is not cyanotic.      Findings: No petechiae or rash.      Nails: There is no clubbing.   Neurological:      Mental Status: He is alert.   Psychiatric:         Mood and Affect: Mood normal.         Behavior: Behavior is cooperative.           Result Review     The following data was reviewed by IRIS Sheppard on 11/21/24.               Results for orders placed in visit on 02/08/22    Adult Transthoracic Echo Complete W/ Cont if Necessary Per Protocol    Interpretation Summary  · The left ventricular cavity is dilated.  · Estimated left ventricular EF was in agreement with the calculated left ventricular EF. Left ventricular ejection fraction appears to be 61 - 65%. Left ventricular systolic function is normal.  · Left ventricular diastolic function is consistent with (grade II w/high LAP) pseudonormalization.  · Mild dilation of the aortic root is present.  · Mild mitral and mild aortic valve regurgitation is present      No results found for this or any previous visit.          Procedures      Assessment & Plan  Coronary artery disease involving native heart without angina pectoris, unspecified vessel or lesion type  Taking medications as instructed without side effects.  No angina or anginal-like symptoms. Continue with current management.  Essential hypertension   Hypertension is well controlled on current doses of antihypertensive medication. Continue current medications Dietary sodium restriction. Check blood pressures daily and record.   Hyperlipidemia, unspecified hyperlipidemia type   Lipid abnormalities are controlled. Target LDL for this patient is <70. Explained to him the respective contributions of genetics, diet, and exercise to  lipid levels and encouraged healthy diet and routine aerobic exercise. Continue current medications.  The medical services provided during this encounter are part of ongoing care related to this patient's single serious condition or complex condition.    Follow Up   Return in about 1 year (around 11/21/2025) for With Dr. Yeung.    Patient was given instructions and counseling regarding his condition or for health maintenance advice. Please see specific information pulled into the AVS if appropriate.     Leatha Alvarado, APRN  11/21/24  13:40 EST    Dictated Utilizing Dragon Dictation

## 2024-11-21 NOTE — ASSESSMENT & PLAN NOTE
Hypertension is well controlled on current doses of antihypertensive medication. Continue current medications Dietary sodium restriction. Check blood pressures daily and record.

## 2024-12-05 NOTE — TELEPHONE ENCOUNTER
The [facility] received a fax that requires your attention. The document has been indexed to the patient’s chart for your review.      Reason for sending: REQUEST FOR REFILL    Documents Description: REQUEST REFILL AUTHORIZATION FORM    Name of Sender: [facility] [place]     Date Indexed: 5.10.23       2

## 2024-12-06 ENCOUNTER — OFFICE VISIT (OUTPATIENT)
Dept: FAMILY MEDICINE CLINIC | Facility: CLINIC | Age: 69
End: 2024-12-06
Payer: MEDICARE

## 2024-12-06 VITALS
DIASTOLIC BLOOD PRESSURE: 74 MMHG | SYSTOLIC BLOOD PRESSURE: 130 MMHG | BODY MASS INDEX: 35.03 KG/M2 | HEIGHT: 71 IN | TEMPERATURE: 96.6 F | OXYGEN SATURATION: 97 % | HEART RATE: 74 BPM | WEIGHT: 250.2 LBS

## 2024-12-06 DIAGNOSIS — Z12.5 SCREENING FOR PROSTATE CANCER: ICD-10-CM

## 2024-12-06 DIAGNOSIS — I10 PRIMARY HYPERTENSION: Primary | ICD-10-CM

## 2024-12-06 DIAGNOSIS — E78.5 HYPERLIPIDEMIA, UNSPECIFIED HYPERLIPIDEMIA TYPE: ICD-10-CM

## 2024-12-06 DIAGNOSIS — Z12.11 SCREENING FOR COLON CANCER: ICD-10-CM

## 2024-12-06 NOTE — PROGRESS NOTES
"Chief Complaint  Hypertension (6 month follow up)    Subjective         Yoan Franco Jr. presents to [facility]  Presents to the office for 6-month follow-up.  Blood pressure is 130/74.  He did bring a log of his blood pressures from home which were all within normal range.  He denies needing refills at this time.  I did explain he is due for labs as well as his colon cancer screening.  Patient does state that he is doing much better than he was 3 weeks ago as he was diagnosed with COVID.  He denies any residual symptoms from this.    Hypertension         Objective     Vitals:    12/06/24 0725   BP: 130/74   BP Location: Right arm   Patient Position: Sitting   Cuff Size: Adult   Pulse: 74   Temp: 96.6 °F (35.9 °C)   TempSrc: Temporal   SpO2: 97%   Weight: 113 kg (250 lb 3.2 oz)   Height: 180.3 cm (71\")      Body mass index is 34.9 kg/m².             Physical Exam  Vitals reviewed.   Constitutional:       Appearance: Normal appearance.   Cardiovascular:      Rate and Rhythm: Normal rate and regular rhythm.      Pulses: Normal pulses.      Heart sounds: Normal heart sounds, S1 normal and S2 normal. No murmur heard.  Pulmonary:      Effort: Pulmonary effort is normal. No respiratory distress.      Breath sounds: Normal breath sounds.   Skin:     General: Skin is warm and dry.   Neurological:      Mental Status: He is alert and oriented to person, place, and time.   Psychiatric:         Attention and Perception: Attention normal.         Mood and Affect: Mood normal.         Behavior: Behavior normal.          Result Review :   The following data was reviewed by: IRIS Spivey on 12/06/2024:      Procedures    Assessment and Plan   Diagnoses and all orders for this visit:    1. Primary hypertension (Primary)  -     Cancel: CBC (No Diff); Future  -     Cancel: Comprehensive Metabolic Panel; Future  -     CBC (No Diff); Future  -     Comprehensive Metabolic Panel; Future    2. " Hyperlipidemia, unspecified hyperlipidemia type  -     Cancel: Comprehensive Metabolic Panel; Future  -     Cancel: Lipid Panel; Future  -     Comprehensive Metabolic Panel; Future  -     Lipid Panel; Future    3. Screening for prostate cancer  -     Cancel: PSA Screen; Future  -     PSA Screen; Future    4. Screening for colon cancer  -     Cologuard - Stool, Per Rectum; Future          Follow Up   Return in about 6 months (around 6/6/2025) for Recheck.  Patient was given instructions and counseling regarding his condition or for health maintenance advice. Please see specific information pulled into the AVS if appropriate.

## 2024-12-16 DIAGNOSIS — I10 ESSENTIAL HYPERTENSION: ICD-10-CM

## 2024-12-16 RX ORDER — SPIRONOLACTONE 25 MG/1
25 TABLET ORAL EVERY OTHER DAY
Qty: 45 TABLET | Refills: 3 | Status: SHIPPED | OUTPATIENT
Start: 2024-12-16

## 2025-01-14 DIAGNOSIS — E78.5 HYPERLIPIDEMIA, UNSPECIFIED HYPERLIPIDEMIA TYPE: ICD-10-CM

## 2025-01-14 NOTE — TELEPHONE ENCOUNTER
Rx Refill Note  Requested Prescriptions     Pending Prescriptions Disp Refills    atorvastatin (LIPITOR) 20 MG tablet [Pharmacy Med Name: atorvastatin 20 mg tablet] 90 tablet 3     Sig: TAKE ONE TABLET BY MOUTH AT BEDTIME        LAST OFFICE VISIT:  11/21/2024     NEXT OFFICE VISIT:  11/21/2025     Does the medication requests match the last office note:    [x] Yes   [] No    Does this refill request meet protocol details for MA to approve:     [] Yes   [x] No    Pt needs lipid panel and other labs

## 2025-01-15 ENCOUNTER — TELEPHONE (OUTPATIENT)
Dept: CARDIOLOGY | Facility: CLINIC | Age: 70
End: 2025-01-15

## 2025-01-15 RX ORDER — ATORVASTATIN CALCIUM 20 MG/1
TABLET, FILM COATED ORAL
Qty: 90 TABLET | Refills: 3 | Status: SHIPPED | OUTPATIENT
Start: 2025-01-15 | End: 2025-01-16 | Stop reason: SDUPTHER

## 2025-01-15 NOTE — TELEPHONE ENCOUNTER
The [facility] received a fax that requires your attention. The document has been indexed to the patient’s chart for your review.      Reason for sending: EXTERNAL MEDICAL RECORD NOTIFICATION     Documents Description: [facility]-1.14.25    Name of Sender: [facility]     Date Indexed: 1.14.25

## 2025-01-16 DIAGNOSIS — E78.5 HYPERLIPIDEMIA, UNSPECIFIED HYPERLIPIDEMIA TYPE: ICD-10-CM

## 2025-01-16 RX ORDER — ATORVASTATIN CALCIUM 20 MG/1
20 TABLET, FILM COATED ORAL
Qty: 90 TABLET | Refills: 3 | Status: SHIPPED | OUTPATIENT
Start: 2025-01-16

## 2025-03-19 RX ORDER — CARVEDILOL 3.12 MG/1
3.12 TABLET ORAL DAILY
Qty: 90 TABLET | Refills: 3 | Status: SHIPPED | OUTPATIENT
Start: 2025-03-19

## 2025-03-19 NOTE — TELEPHONE ENCOUNTER
The [facility] received a fax that requires your attention. The document has been indexed to the patient’s chart for your review.      Reason for sending: EXTERNAL MEDICAL RECORD NOTIFICATION     Documents Description: [facility]FILL[facility]-3.18.25    Name of Sender: [facility]     Date Indexed: 3.18.25

## 2025-03-19 NOTE — TELEPHONE ENCOUNTER
Rx Refill Note  Requested Prescriptions     Pending Prescriptions Disp Refills    carvedilol (COREG) 3.125 MG tablet 90 tablet 3     Sig: Take 1 tablet by mouth Daily.        LAST OFFICE VISIT:  11/21/2024     NEXT OFFICE VISIT:  11/21/2025     Does the medication requests match the last office note:    [x] Yes   [] No    Does this refill request meet protocol details for MA to approve:     [x] Yes   [] No

## 2025-04-29 DIAGNOSIS — I25.10 CORONARY ARTERY DISEASE INVOLVING NATIVE HEART WITHOUT ANGINA PECTORIS, UNSPECIFIED VESSEL OR LESION TYPE: ICD-10-CM

## 2025-04-29 RX ORDER — NITROGLYCERIN 0.4 MG/1
TABLET SUBLINGUAL
Qty: 25 TABLET | Refills: 2 | Status: SHIPPED | OUTPATIENT
Start: 2025-04-29

## 2025-04-29 RX ORDER — LOSARTAN POTASSIUM 25 MG/1
25 TABLET ORAL DAILY
Qty: 90 TABLET | Refills: 3 | Status: SHIPPED | OUTPATIENT
Start: 2025-04-29

## 2025-04-29 NOTE — TELEPHONE ENCOUNTER
Rx Refill Note  Requested Prescriptions     Pending Prescriptions Disp Refills    nitroglycerin (NITROSTAT) 0.4 MG SL tablet [Pharmacy Med Name: nitroglycerin 0.4 mg sublingual tablet] 25 tablet 1     Sig: PLACE 1 TABLET UNDER TONGUE EVERY 5 MINUTES AS NEEDED FOR CHEST PAIN (UP TO 3 DOSES PER EPISODE) IF NO RELIEF, CALL 911 OR GO TO ER.        LAST OFFICE VISIT:  11/21/2024     NEXT OFFICE VISIT:  11/21/2025     Does the medication requests match the last office note:    [x] Yes   [] No    Does this refill request meet protocol details for MA to approve:     [x] Yes   [] No   [] No Protocols Provided

## 2025-06-06 ENCOUNTER — OFFICE VISIT (OUTPATIENT)
Dept: FAMILY MEDICINE CLINIC | Facility: CLINIC | Age: 70
End: 2025-06-06
Payer: MEDICARE

## 2025-06-06 VITALS
TEMPERATURE: 98.3 F | RESPIRATION RATE: 19 BRPM | WEIGHT: 246.2 LBS | SYSTOLIC BLOOD PRESSURE: 126 MMHG | OXYGEN SATURATION: 97 % | BODY MASS INDEX: 34.47 KG/M2 | HEART RATE: 64 BPM | DIASTOLIC BLOOD PRESSURE: 64 MMHG | HEIGHT: 71 IN

## 2025-06-06 DIAGNOSIS — E78.5 HYPERLIPIDEMIA, UNSPECIFIED HYPERLIPIDEMIA TYPE: ICD-10-CM

## 2025-06-06 DIAGNOSIS — I10 PRIMARY HYPERTENSION: ICD-10-CM

## 2025-06-06 DIAGNOSIS — Z12.5 SCREENING FOR PROSTATE CANCER: ICD-10-CM

## 2025-06-06 DIAGNOSIS — Z00.00 MEDICARE ANNUAL WELLNESS VISIT, SUBSEQUENT: Primary | ICD-10-CM

## 2025-06-06 NOTE — ASSESSMENT & PLAN NOTE
Orders:    CBC (No Diff); Future    Comprehensive Metabolic Panel; Future    Lipid Panel; Future    TSH+Free T4; Future

## 2025-06-06 NOTE — PROGRESS NOTES
Subjective   The ABCs of the Annual Wellness Visit  Medicare Wellness Visit      Yoan Franco Jr. is a 69 y.o. patient who presents for a Medicare Wellness Visit.    The following portions of the patient's history were reviewed and   updated as appropriate: allergies, current medications, past family history, past medical history, past social history, past surgical history, and problem list.    Compared to one year ago, the patient's physical   health is the same.  Compared to one year ago, the patient's mental   health is the same.    Recent Hospitalizations:  He was not admitted to the hospital during the last year.     Current Medical Providers:  Patient Care Team:  Ian Mane APRN as PCP - General (Nurse Practitioner)  Leatah Alvarado APRN as Nurse Practitioner (Cardiology)    Outpatient Medications Prior to Visit   Medication Sig Dispense Refill    Alpha-Lipoic Acid 600 MG tablet Take  by mouth.      APPLE CIDER VINEGAR PO Take 900 mg by mouth Daily.      ascorbic acid (VITAMIN C) 500 MG tablet Take 1 tablet by mouth Daily.      aspirin 81 MG chewable tablet Chew 1 tablet Daily.      atorvastatin (LIPITOR) 20 MG tablet Take 1 tablet by mouth every night at bedtime. 90 tablet 3    carvedilol (COREG) 3.125 MG tablet Take 1 tablet by mouth Daily. 90 tablet 3    Cinnamon 500 MG tablet Take 500 mg by mouth Daily.      clobetasol (TEMOVATE) 0.05 % ointment Apply 1 Application topically to the appropriate area as directed 2 (Two) Times a Day.      coenzyme Q10 100 MG capsule Take 1 capsule by mouth Daily.      Elderberry 500 MG capsule Take 500 mg by mouth Daily.      Flaxseed, Linseed, (FLAXSEED OIL PO) Take 1,300 mg by mouth 2 (Two) Times a Day.      folic acid (FOLVITE) 1 MG tablet Take 1 tablet by mouth Daily.      folic acid-vit B6-vit B12 (FOLTABS) 0.8-10-0.115 MG tablet tablet Take  by mouth Daily.      losartan (COZAAR) 25 MG tablet Take 1 tablet by mouth Daily. 90 tablet 3    magnesium  oxide (MAGOX) 400 (241.3 Mg) MG tablet tablet Take 1 tablet by mouth Daily.      metroNIDAZOLE (METROGEL) 0.75 % gel Apply  topically to the appropriate area as directed 2 (Two) Times a Day.      Milk Thistle 250 MG capsule Take 1 tablet by mouth 2 (Two) Times a Day.      nitroglycerin (NITROSTAT) 0.4 MG SL tablet PLACE 1 TABLET UNDER TONGUE EVERY 5 MINUTES AS NEEDED FOR CHEST PAIN (UP TO 3 DOSES PER EPISODE) IF NO RELIEF, CALL 911 OR GO TO ER. 25 tablet 2    Pyridoxine HCl (Vitamin B6) 100 MG tablet Take 200 mg by mouth Daily.      saccharomyces boulardii (FLORASTOR) 250 MG capsule Take 1 capsule by mouth Daily.      spironolactone (ALDACTONE) 25 MG tablet TAKE ONE TABLET BY MOUTH EVERY OTHER DAY 45 tablet 3    Turmeric (QC Tumeric Complex) 500 MG capsule Take 3 capsules by mouth Daily.      Wheat Dextrin (BENEFIBER DRINK MIX PO) Take  by mouth Daily.      Zinc Gluconate 50 MG capsule Take 50 mg by mouth Daily.      azithromycin (Zithromax Z-Paulie) 250 MG tablet Take 2 tablets by mouth on day 1, then 1 tablet daily on days 2-5 (Patient not taking: Reported on 11/21/2024) 6 tablet 0    methotrexate 2.5 MG tablet Take 4 tablets by mouth 1 (One) Time Per Week. (Patient not taking: Reported on 11/21/2024)       No facility-administered medications prior to visit.     No opioid medication identified on active medication list. I have reviewed chart for other potential  high risk medication/s and harmful drug interactions in the elderly.      Aspirin is on active medication list. Aspirin use is indicated based on review of current medical condition/s. Pros and cons of this therapy have been discussed today. Benefits of this medication outweigh potential harm.  Patient has been encouraged to continue taking this medication.  .      Patient Active Problem List   Diagnosis    Essential hypertension    Hyperlipidemia    Coronary artery disease involving native heart without angina pectoris    Morbid (severe) obesity due to  "excess calories    Nonrheumatic mitral valve regurgitation     Advance Care Planning Advance Directive is on file.  ACP discussion was declined by the patient. Patient has an advance directive in EMR which is still valid.             Objective   Vitals:    25 0724   BP: 126/64   Pulse: 64   Resp: 19   Temp: 98.3 °F (36.8 °C)   SpO2: 97%   Weight: 112 kg (246 lb 3.2 oz)   Height: 180.3 cm (71\")       Estimated body mass index is 34.34 kg/m² as calculated from the following:    Height as of this encounter: 180.3 cm (71\").    Weight as of this encounter: 112 kg (246 lb 3.2 oz).                Does the patient have evidence of cognitive impairment? No                                                                                                Health  Risk Assessment    Smoking Status:  Social History     Tobacco Use   Smoking Status Former    Current packs/day: 0.00    Average packs/day: 1.5 packs/day for 18.7 years (28.0 ttl pk-yrs)    Types: Cigarettes, Cigars    Start date: 1971    Quit date: 1989    Years since quittin.7   Smokeless Tobacco Never   Tobacco Comments    Glad I stopped     Alcohol Consumption:  Social History     Substance and Sexual Activity   Alcohol Use Yes    Alcohol/week: 5.0 standard drinks of alcohol    Types: 4 Cans of beer, 1 Shots of liquor per week    Comment: social       Fall Risk Screen  STEADI Fall Risk Assessment was completed, and patient is at LOW risk for falls.Assessment completed on:2025    Depression Screening   Little interest or pleasure in doing things? Not at all   Feeling down, depressed, or hopeless? Not at all   PHQ-2 Total Score 0      Health Habits and Functional and Cognitive Screenin/6/2025     7:28 AM   Functional & Cognitive Status   Do you have difficulty preparing food and eating? No   Do you have difficulty bathing yourself, getting dressed or grooming yourself? No   Do you have difficulty using the toilet? No   Do you have " difficulty moving around from place to place? No   Do you have trouble with steps or getting out of a bed or a chair? No   Current Diet Well Balanced Diet   Dental Exam Up to date   Eye Exam Up to date   Exercise (times per week) 2 times per week   Current Exercises Include Yard Work;Walking;Gardening   Do you need help using the phone?  No   Are you deaf or do you have serious difficulty hearing?  Yes   Do you need help to go to places out of walking distance? No   Do you need help shopping? No   Do you need help preparing meals?  No   Do you need help with housework?  No   Do you need help with laundry? No   Do you need help taking your medications? No   Do you need help managing money? No   Do you ever drive or ride in a car without wearing a seat belt? No   Have you felt unusual stress, anger or loneliness in the last month? No   Who do you live with? Spouse   If you need help, do you have trouble finding someone available to you? No   Have you been bothered in the last four weeks by sexual problems? No   Do you have difficulty concentrating, remembering or making decisions? No           Age-appropriate Screening Schedule:  Refer to the list below for future screening recommendations based on patient's age, sex and/or medical conditions. Orders for these recommended tests are listed in the plan section. The patient has been provided with a written plan.    Health Maintenance List  Health Maintenance   Topic Date Due    TDAP/TD VACCINES (1 - Tdap) Never done    ZOSTER VACCINE (1 of 2) Never done    AAA SCREEN ONCE  Never done    Pneumococcal Vaccine 50+ (3 of 3 - PCV20 or PCV21) 02/07/2024    COVID-19 Vaccine (1 - 2024-25 season) 06/20/2025 (Originally 9/1/2024)    INFLUENZA VACCINE  07/01/2025    LIPID PANEL  12/06/2025    ANNUAL WELLNESS VISIT  06/06/2026    COLORECTAL CANCER SCREENING  12/26/2027    HEPATITIS C SCREENING  Completed                                                                                  "                                                               CMS Preventative Services Quick Reference  Risk Factors Identified During Encounter  None Identified    The above risks/problems have been discussed with the patient.  Pertinent information has been shared with the patient in the After Visit Summary.  An After Visit Summary and PPPS were made available to the patient.    Follow Up:   Next Medicare Wellness visit to be scheduled in 1 year.         Additional E&M Note during same encounter follows:  Patient has additional, significant, and separately identifiable condition(s)/problem(s) that require work above and beyond the Medicare Wellness Visit     Chief Complaint  Medicare Wellness-subsequent    Subjective   HPI  Don is also being seen today for additional medical problem/s.  6-month follow-up.  Patient did bring a log of his blood pressure readings.  I did review this with the patient.  Patient's blood pressure on today is 126/64.  Denies any chest pain shortness breath palpitations at this time.  He does state that he does not want the pneumococcal vaccination at this time.  He denies needing refills on his medications at this time              Objective   Vital Signs:  /64   Pulse 64   Temp 98.3 °F (36.8 °C)   Resp 19   Ht 180.3 cm (71\")   Wt 112 kg (246 lb 3.2 oz)   SpO2 97%   BMI 34.34 kg/m²   Physical Exam  Vitals reviewed.   Constitutional:       Appearance: Normal appearance.   Cardiovascular:      Rate and Rhythm: Normal rate and regular rhythm.      Pulses: Normal pulses.      Heart sounds: Normal heart sounds, S1 normal and S2 normal. No murmur heard.  Pulmonary:      Effort: Pulmonary effort is normal. No respiratory distress.      Breath sounds: Normal breath sounds.   Skin:     General: Skin is warm and dry.   Neurological:      Mental Status: He is alert and oriented to person, place, and time.   Psychiatric:         Attention and Perception: Attention normal.         Mood " and Affect: Mood normal.         Behavior: Behavior normal.                    Assessment and Plan      Medicare annual wellness visit, subsequent         Primary hypertension      Orders:    CBC (No Diff); Future    Comprehensive Metabolic Panel; Future    Lipid Panel; Future    TSH+Free T4; Future    Hyperlipidemia, unspecified hyperlipidemia type       Orders:    CBC (No Diff); Future    Comprehensive Metabolic Panel; Future    Lipid Panel; Future    TSH+Free T4; Future    Screening for prostate cancer    Orders:    PSA SCREENING; Future            Follow Up   Return in about 6 months (around 12/6/2025).  Patient was given instructions and counseling regarding his condition or for health maintenance advice. Please see specific information pulled into the AVS if appropriate.

## 2025-08-07 ENCOUNTER — HOSPITAL ENCOUNTER (EMERGENCY)
Facility: HOSPITAL | Age: 70
Discharge: HOME | End: 2025-08-07
Attending: PHARMACIST
Payer: MEDICARE

## 2025-08-07 ENCOUNTER — APPOINTMENT (OUTPATIENT)
Dept: GENERAL RADIOLOGY | Facility: HOSPITAL | Age: 70
End: 2025-08-07
Payer: MEDICARE

## 2025-08-07 VITALS
RESPIRATION RATE: 18 BRPM | SYSTOLIC BLOOD PRESSURE: 130 MMHG | DIASTOLIC BLOOD PRESSURE: 65 MMHG | WEIGHT: 242.29 LBS | BODY MASS INDEX: 34.69 KG/M2 | HEART RATE: 50 BPM | TEMPERATURE: 98.7 F | OXYGEN SATURATION: 93 % | HEIGHT: 70 IN

## 2025-08-07 DIAGNOSIS — R07.89 CHEST DISCOMFORT: Primary | ICD-10-CM

## 2025-08-07 LAB
ALBUMIN SERPL-MCNC: 4.3 G/DL (ref 3.5–5.2)
ALBUMIN/GLOB SERPL: 1.2 G/DL
ALP SERPL-CCNC: 85 U/L (ref 39–117)
ALT SERPL W P-5'-P-CCNC: 18 U/L (ref 1–41)
ANION GAP SERPL CALCULATED.3IONS-SCNC: 11.3 MMOL/L (ref 5–15)
AST SERPL-CCNC: 19 U/L (ref 1–40)
BASOPHILS # BLD AUTO: 0.04 10*3/MM3 (ref 0–0.2)
BASOPHILS NFR BLD AUTO: 0.6 % (ref 0–1.5)
BILIRUB SERPL-MCNC: 1 MG/DL (ref 0–1.2)
BUN SERPL-MCNC: 13.6 MG/DL (ref 8–23)
BUN/CREAT SERPL: 13.2 (ref 7–25)
CALCIUM SPEC-SCNC: 9.5 MG/DL (ref 8.6–10.5)
CHLORIDE SERPL-SCNC: 104 MMOL/L (ref 98–107)
CO2 SERPL-SCNC: 24.7 MMOL/L (ref 22–29)
CREAT SERPL-MCNC: 1.03 MG/DL (ref 0.76–1.27)
DEPRECATED RDW RBC AUTO: 47 FL (ref 37–54)
EGFRCR SERPLBLD CKD-EPI 2021: 78.1 ML/MIN/1.73
EOSINOPHIL # BLD AUTO: 0.09 10*3/MM3 (ref 0–0.4)
EOSINOPHIL NFR BLD AUTO: 1.3 % (ref 0.3–6.2)
ERYTHROCYTE [DISTWIDTH] IN BLOOD BY AUTOMATED COUNT: 13.3 % (ref 12.3–15.4)
GEN 5 1HR TROPONIN T REFLEX: 8 NG/L
GLOBULIN UR ELPH-MCNC: 3.7 GM/DL
GLUCOSE SERPL-MCNC: 113 MG/DL (ref 65–99)
HCT VFR BLD AUTO: 44.7 % (ref 37.5–51)
HGB BLD-MCNC: 14.5 G/DL (ref 13–17.7)
HOLD SPECIMEN: NORMAL
HOLD SPECIMEN: NORMAL
IMM GRANULOCYTES # BLD AUTO: 0.02 10*3/MM3 (ref 0–0.05)
IMM GRANULOCYTES NFR BLD AUTO: 0.3 % (ref 0–0.5)
LIPASE SERPL-CCNC: 28 U/L (ref 13–60)
LYMPHOCYTES # BLD AUTO: 1.48 10*3/MM3 (ref 0.7–3.1)
LYMPHOCYTES NFR BLD AUTO: 20.8 % (ref 19.6–45.3)
MAGNESIUM SERPL-MCNC: 1.8 MG/DL (ref 1.6–2.4)
MCH RBC QN AUTO: 31.2 PG (ref 26.6–33)
MCHC RBC AUTO-ENTMCNC: 32.4 G/DL (ref 31.5–35.7)
MCV RBC AUTO: 96.1 FL (ref 79–97)
MONOCYTES # BLD AUTO: 0.36 10*3/MM3 (ref 0.1–0.9)
MONOCYTES NFR BLD AUTO: 5.1 % (ref 5–12)
NEUTROPHILS NFR BLD AUTO: 5.12 10*3/MM3 (ref 1.7–7)
NEUTROPHILS NFR BLD AUTO: 71.9 % (ref 42.7–76)
NRBC BLD AUTO-RTO: 0 /100 WBC (ref 0–0.2)
NT-PROBNP SERPL-MCNC: 387.7 PG/ML (ref 0–900)
PLATELET # BLD AUTO: 285 10*3/MM3 (ref 140–450)
PMV BLD AUTO: 10 FL (ref 6–12)
POTASSIUM SERPL-SCNC: 4.3 MMOL/L (ref 3.5–5.2)
PROT SERPL-MCNC: 8 G/DL (ref 6–8.5)
RBC # BLD AUTO: 4.65 10*6/MM3 (ref 4.14–5.8)
SODIUM SERPL-SCNC: 140 MMOL/L (ref 136–145)
TROPONIN T NUMERIC DELTA: 0 NG/L
TROPONIN T SERPL HS-MCNC: 8 NG/L
WBC NRBC COR # BLD AUTO: 7.11 10*3/MM3 (ref 3.4–10.8)
WHOLE BLOOD HOLD COAG: NORMAL
WHOLE BLOOD HOLD SPECIMEN: NORMAL

## 2025-08-07 PROCEDURE — 93010 ELECTROCARDIOGRAM REPORT: CPT | Performed by: INTERNAL MEDICINE

## 2025-08-07 PROCEDURE — 99284 EMERGENCY DEPT VISIT MOD MDM: CPT

## 2025-08-07 PROCEDURE — 85025 COMPLETE CBC W/AUTO DIFF WBC: CPT

## 2025-08-07 PROCEDURE — 80053 COMPREHEN METABOLIC PANEL: CPT | Performed by: PHARMACIST

## 2025-08-07 PROCEDURE — 93005 ELECTROCARDIOGRAM TRACING: CPT | Performed by: PHARMACIST

## 2025-08-07 PROCEDURE — 84484 ASSAY OF TROPONIN QUANT: CPT | Performed by: PHARMACIST

## 2025-08-07 PROCEDURE — 83880 ASSAY OF NATRIURETIC PEPTIDE: CPT | Performed by: PHARMACIST

## 2025-08-07 PROCEDURE — 36415 COLL VENOUS BLD VENIPUNCTURE: CPT

## 2025-08-07 PROCEDURE — 83690 ASSAY OF LIPASE: CPT | Performed by: PHARMACIST

## 2025-08-07 PROCEDURE — 93005 ELECTROCARDIOGRAM TRACING: CPT

## 2025-08-07 PROCEDURE — 83735 ASSAY OF MAGNESIUM: CPT | Performed by: PHARMACIST

## 2025-08-07 PROCEDURE — 71045 X-RAY EXAM CHEST 1 VIEW: CPT

## 2025-08-07 RX ORDER — SODIUM CHLORIDE 0.9 % (FLUSH) 0.9 %
10 SYRINGE (ML) INJECTION AS NEEDED
Status: DISCONTINUED | OUTPATIENT
Start: 2025-08-07 | End: 2025-08-07 | Stop reason: HOSPADM

## 2025-08-07 RX ORDER — ASPIRIN 81 MG/1
324 TABLET, CHEWABLE ORAL ONCE
Status: DISCONTINUED | OUTPATIENT
Start: 2025-08-07 | End: 2025-08-07 | Stop reason: HOSPADM

## 2025-08-08 ENCOUNTER — TELEPHONE (OUTPATIENT)
Dept: CARDIOLOGY | Facility: CLINIC | Age: 70
End: 2025-08-08
Payer: MEDICARE

## 2025-08-08 ENCOUNTER — TELEPHONE (OUTPATIENT)
Dept: FAMILY MEDICINE CLINIC | Facility: CLINIC | Age: 70
End: 2025-08-08
Payer: MEDICARE

## 2025-08-08 LAB
QT INTERVAL: 371 MS
QTC INTERVAL: 400 MS

## 2025-08-15 ENCOUNTER — OFFICE VISIT (OUTPATIENT)
Dept: CARDIOLOGY | Facility: CLINIC | Age: 70
End: 2025-08-15
Payer: MEDICARE

## 2025-08-15 VITALS
DIASTOLIC BLOOD PRESSURE: 79 MMHG | HEIGHT: 70 IN | SYSTOLIC BLOOD PRESSURE: 140 MMHG | BODY MASS INDEX: 35.07 KG/M2 | HEART RATE: 55 BPM | WEIGHT: 245 LBS

## 2025-08-15 DIAGNOSIS — I25.10 CORONARY ARTERY DISEASE INVOLVING NATIVE HEART WITHOUT ANGINA PECTORIS, UNSPECIFIED VESSEL OR LESION TYPE: Primary | ICD-10-CM

## 2025-08-15 DIAGNOSIS — E78.2 MIXED HYPERLIPIDEMIA: ICD-10-CM

## 2025-08-15 DIAGNOSIS — I35.1 AORTIC VALVE INSUFFICIENCY, ETIOLOGY OF CARDIAC VALVE DISEASE UNSPECIFIED: ICD-10-CM

## 2025-08-15 DIAGNOSIS — I10 ESSENTIAL HYPERTENSION: ICD-10-CM

## 2025-08-15 DIAGNOSIS — I34.0 NONRHEUMATIC MITRAL VALVE REGURGITATION: ICD-10-CM
